# Patient Record
Sex: FEMALE | Race: WHITE | Employment: UNEMPLOYED | URBAN - METROPOLITAN AREA
[De-identification: names, ages, dates, MRNs, and addresses within clinical notes are randomized per-mention and may not be internally consistent; named-entity substitution may affect disease eponyms.]

---
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
## 2022-01-04 ENCOUNTER — OFFICE VISIT (OUTPATIENT)
Dept: FAMILY MEDICINE CLINIC | Age: 22
End: 2022-01-04

## 2022-01-04 VITALS
WEIGHT: 105 LBS | SYSTOLIC BLOOD PRESSURE: 128 MMHG | TEMPERATURE: 98.1 F | OXYGEN SATURATION: 98 % | DIASTOLIC BLOOD PRESSURE: 80 MMHG | HEART RATE: 74 BPM | BODY MASS INDEX: 20.62 KG/M2 | HEIGHT: 60 IN | RESPIRATION RATE: 18 BRPM

## 2022-01-04 DIAGNOSIS — U07.1 COVID-19: ICD-10-CM

## 2022-01-04 DIAGNOSIS — R05.9 COUGH: Primary | ICD-10-CM

## 2022-01-04 LAB
Lab: ABNORMAL
PERFORMING INSTRUMENT: ABNORMAL
QC PASS/FAIL: ABNORMAL
SARS-COV-2, POC: DETECTED

## 2022-01-04 PROCEDURE — 99203 OFFICE O/P NEW LOW 30 MIN: CPT | Performed by: PHYSICIAN ASSISTANT

## 2022-01-04 PROCEDURE — 87426 SARSCOV CORONAVIRUS AG IA: CPT | Performed by: PHYSICIAN ASSISTANT

## 2022-01-04 ASSESSMENT — ENCOUNTER SYMPTOMS
DIARRHEA: 0
NAUSEA: 0
BACK PAIN: 0
ABDOMINAL PAIN: 0
PHOTOPHOBIA: 0
SHORTNESS OF BREATH: 0
SORE THROAT: 0
VOMITING: 0
COUGH: 1

## 2022-01-04 NOTE — PROGRESS NOTES
22  Nela Parker : 2000 Sex: female  Age 25 y.o. Subjective:  Chief Complaint   Patient presents with    Cough         77-year-old female presents to the walk-in clinic with her boyfriend who is her . She started Saturday night with cough, congestion and feeling feverish. She has not taking any over-the-counter medications. She is not vaccinated. She denies any chance of pregnancy. No shortness of breath or chest pain. No hemoptysis. No nausea or vomiting. Review of Systems   Constitutional: Positive for fever. Negative for chills. HENT: Positive for congestion. Negative for ear pain and sore throat. Eyes: Negative for photophobia and visual disturbance. Respiratory: Positive for cough. Negative for shortness of breath. Cardiovascular: Negative for chest pain. Gastrointestinal: Negative for abdominal pain, diarrhea, nausea and vomiting. Genitourinary: Negative for difficulty urinating, dysuria, frequency and urgency. Musculoskeletal: Negative for back pain, neck pain and neck stiffness. Skin: Negative for rash. Neurological: Negative for dizziness, syncope, weakness, light-headedness and headaches. Hematological: Negative for adenopathy. Does not bruise/bleed easily. Psychiatric/Behavioral: Negative for agitation and confusion. All other systems reviewed and are negative. PMH:   History reviewed. No pertinent past medical history. History reviewed. No pertinent surgical history. History reviewed. No pertinent family history. Medications:   No current outpatient medications on file. Allergies:   No Known Allergies    Social History:     Social History     Tobacco Use    Smoking status: Never Smoker    Smokeless tobacco: Never Used   Substance Use Topics    Alcohol use: Never    Drug use: Never       Patient lives at home.     Physical Exam:     Vitals:    22 0859   BP: 128/80   Pulse: 74   Resp: 18   Temp: 98.1 °F (36.7 °C)   TempSrc: Temporal   SpO2: 98%   Weight: 105 lb (47.6 kg)   Height: 5' (1.524 m)       Exam:  Physical Exam  Vitals and nursing note reviewed. Constitutional:       General: She is not in acute distress. Appearance: She is well-developed. HENT:      Head: Normocephalic and atraumatic. Right Ear: Tympanic membrane normal.      Left Ear: Tympanic membrane normal.      Nose: Nose normal.      Mouth/Throat:      Mouth: Mucous membranes are moist.      Pharynx: Oropharynx is clear. Eyes:      Conjunctiva/sclera: Conjunctivae normal.      Pupils: Pupils are equal, round, and reactive to light. Cardiovascular:      Rate and Rhythm: Normal rate and regular rhythm. Pulmonary:      Effort: Pulmonary effort is normal. No respiratory distress. Breath sounds: Normal breath sounds. Abdominal:      General: Bowel sounds are normal.      Palpations: Abdomen is soft. Tenderness: There is no abdominal tenderness. Musculoskeletal:         General: Normal range of motion. Cervical back: Normal range of motion. No rigidity. Lymphadenopathy:      Cervical: No cervical adenopathy. Skin:     General: Skin is warm and dry. Neurological:      General: No focal deficit present. Mental Status: She is alert and oriented to person, place, and time. Psychiatric:         Mood and Affect: Mood normal.         Behavior: Behavior normal.         Thought Content: Thought content normal.         Judgment: Judgment normal.           Testing:     .  Results for orders placed or performed in visit on 01/04/22   POCT COVID-19, Antigen   Result Value Ref Range    SARS-COV-2, POC Detected (A) Not Detected    Lot Number [id number]     QC Pass/Fail pass     Performing Instrument qLearning              Medical Decision Making:       Patient upon arrival did not appear toxic or lethargic. Vital signs were reviewed. Past medical history reviewed. Allergies reviewed.      Medications reviewed. Patient is presenting with the above complaint of cough and congestion. Rapid antigen is positive for COVID-19. Patient was educated on this diagnosis. Patient is to use over-the-counter analgesics and decongestants as well as vitamins. Patient is to get planty of rest. Patient is to maintain good oral intake. Patient is to isolate for 5 days from the date of the positive test.  If asymptomatic, the patient will be required to continue to wear a mask for an additional 5 days after that. Patient is to monitor symptoms. Patient was educated on signs and symptoms that would warrant emergent evaluation in the emergency department. Patient will follow up closely with PCP. Patient understands the plan is agreeable. They will return or go to the emergency department for worsening symptoms. Clinical Impression:   Cristiana Olvera was seen today for cough. Diagnoses and all orders for this visit:    Cough  -     POCT COVID-19, Antigen    COVID-19        The patient is to call for any concerns or return if any of the signs or symptoms worsen. The patient is to follow-up with PCP in the next 2-3 days for repeat evaluation repeat assessment or go directly to the emergency department. SIGNATURE: Gisell Stokes PA-C

## 2022-01-04 NOTE — LETTER
[facility]  [address] [place] [address]  Phone: [phone]  Fax: [address] [place], [place]        January 4, 2022     Patient: Nba Anthony   YOB: 2000   Date of Visit: 1/4/2022       To Whom it May Concern:    Nba Anthony was seen in my clinic on 1/4/2022. She has tested positive for COVID-19. She may return to work on 1/10/2022 but is required to wear a mask for an additional 5 days. If you have any questions or concerns, please don't hesitate to call.     Sincerely,         ARLENE YU

## 2022-06-27 ENCOUNTER — INITIAL PRENATAL (OUTPATIENT)
Dept: OBGYN | Age: 22
End: 2022-06-27

## 2022-06-27 ENCOUNTER — CLINICAL DOCUMENTATION (OUTPATIENT)
Dept: OBGYN | Age: 22
End: 2022-06-27

## 2022-06-27 VITALS
WEIGHT: 97 LBS | DIASTOLIC BLOOD PRESSURE: 52 MMHG | BODY MASS INDEX: 18.94 KG/M2 | SYSTOLIC BLOOD PRESSURE: 96 MMHG | HEART RATE: 76 BPM

## 2022-06-27 DIAGNOSIS — Z32.01 POSITIVE PREGNANCY TEST: ICD-10-CM

## 2022-06-27 DIAGNOSIS — Z3A.11 11 WEEKS GESTATION OF PREGNANCY: ICD-10-CM

## 2022-06-27 LAB
CONTROL: NORMAL
GLUCOSE URINE, POC: NEGATIVE
PREGNANCY TEST URINE, POC: POSITIVE
PROTEIN UA: POSITIVE

## 2022-06-27 PROCEDURE — 81025 URINE PREGNANCY TEST: CPT | Performed by: STUDENT IN AN ORGANIZED HEALTH CARE EDUCATION/TRAINING PROGRAM

## 2022-06-27 PROCEDURE — 99203 OFFICE O/P NEW LOW 30 MIN: CPT | Performed by: STUDENT IN AN ORGANIZED HEALTH CARE EDUCATION/TRAINING PROGRAM

## 2022-06-27 PROCEDURE — 81002 URINALYSIS NONAUTO W/O SCOPE: CPT | Performed by: STUDENT IN AN ORGANIZED HEALTH CARE EDUCATION/TRAINING PROGRAM

## 2022-06-27 NOTE — PROGRESS NOTES
CHW introduce Centering to patient. Patient will participate in GRP 24 )n 8/18/2022 @ 1:00 pm . Transportation will be provided for all appointments.

## 2022-06-27 NOTE — PROGRESS NOTES
Patient alert and pleasant with no complaints. Here today for to establish care and for initial prenatal visit. Pregnancy test positive. Urine for glucose negative and protein obtained with trace results. Discharge instructions have been discussed with the patient. Patient advised to call our office with any questions or concerns. Voiced understanding.

## 2022-06-27 NOTE — PROGRESS NOTES
Denise Caraballo is sneezing, coughing and has a runny nose  She has not testing herself for COVID  She says that she has the flu  Asked patient to return once feeling well  Ordered US and prenatal labs

## 2022-06-29 LAB — URINE CULTURE, ROUTINE: NORMAL

## 2022-06-30 LAB
C. TRACHOMATIS DNA ,URINE: NEGATIVE
N. GONORRHOEAE DNA, URINE: NEGATIVE
SOURCE: NORMAL

## 2022-07-05 ENCOUNTER — TELEPHONE (OUTPATIENT)
Dept: OBGYN | Age: 22
End: 2022-07-05

## 2022-07-05 NOTE — TELEPHONE ENCOUNTER
CHW called patient to confirm her prenatal appointment for 7/7/2022 @ 9:45 am. Transportation for patient will be provided.

## 2022-07-07 ENCOUNTER — ROUTINE PRENATAL (OUTPATIENT)
Dept: OBGYN | Age: 22
End: 2022-07-07

## 2022-07-07 VITALS
HEART RATE: 69 BPM | WEIGHT: 96 LBS | SYSTOLIC BLOOD PRESSURE: 112 MMHG | BODY MASS INDEX: 18.75 KG/M2 | DIASTOLIC BLOOD PRESSURE: 62 MMHG

## 2022-07-07 DIAGNOSIS — Z34.91 PRENATAL CARE IN FIRST TRIMESTER: Primary | ICD-10-CM

## 2022-07-07 DIAGNOSIS — Z32.01 POSITIVE PREGNANCY TEST: ICD-10-CM

## 2022-07-07 DIAGNOSIS — Z34.91 PRENATAL CARE IN FIRST TRIMESTER: ICD-10-CM

## 2022-07-07 LAB
ALBUMIN SERPL-MCNC: 4.2 G/DL (ref 3.5–5.2)
ALP BLD-CCNC: 54 U/L (ref 35–104)
ALT SERPL-CCNC: 11 U/L (ref 0–32)
ANION GAP SERPL CALCULATED.3IONS-SCNC: 14 MMOL/L (ref 7–16)
AST SERPL-CCNC: 21 U/L (ref 0–31)
BASOPHILS ABSOLUTE: 0.02 E9/L (ref 0–0.2)
BASOPHILS RELATIVE PERCENT: 0.3 % (ref 0–2)
BILIRUB SERPL-MCNC: 0.3 MG/DL (ref 0–1.2)
BUN BLDV-MCNC: 5 MG/DL (ref 6–20)
CALCIUM SERPL-MCNC: 9.1 MG/DL (ref 8.6–10.2)
CHLORIDE BLD-SCNC: 105 MMOL/L (ref 98–107)
CO2: 19 MMOL/L (ref 22–29)
CREAT SERPL-MCNC: 0.4 MG/DL (ref 0.5–1)
EOSINOPHILS ABSOLUTE: 0.11 E9/L (ref 0.05–0.5)
EOSINOPHILS RELATIVE PERCENT: 1.7 % (ref 0–6)
GFR AFRICAN AMERICAN: >60
GFR NON-AFRICAN AMERICAN: >60 ML/MIN/1.73
GLUCOSE BLD-MCNC: 75 MG/DL (ref 74–99)
GLUCOSE URINE, POC: NEGATIVE
HCT VFR BLD CALC: 36.5 % (ref 34–48)
HEMOGLOBIN: 11.4 G/DL (ref 11.5–15.5)
IMMATURE GRANULOCYTES #: 0.02 E9/L
IMMATURE GRANULOCYTES %: 0.3 % (ref 0–5)
LYMPHOCYTES ABSOLUTE: 1.97 E9/L (ref 1.5–4)
LYMPHOCYTES RELATIVE PERCENT: 29.7 % (ref 20–42)
MCH RBC QN AUTO: 23.7 PG (ref 26–35)
MCHC RBC AUTO-ENTMCNC: 31.2 % (ref 32–34.5)
MCV RBC AUTO: 75.9 FL (ref 80–99.9)
MONOCYTES ABSOLUTE: 0.62 E9/L (ref 0.1–0.95)
MONOCYTES RELATIVE PERCENT: 9.4 % (ref 2–12)
NEUTROPHILS ABSOLUTE: 3.89 E9/L (ref 1.8–7.3)
NEUTROPHILS RELATIVE PERCENT: 58.6 % (ref 43–80)
PDW BLD-RTO: 14 FL (ref 11.5–15)
PLATELET # BLD: 353 E9/L (ref 130–450)
PMV BLD AUTO: 9.6 FL (ref 7–12)
POTASSIUM SERPL-SCNC: 4.3 MMOL/L (ref 3.5–5)
PROTEIN UA: NEGATIVE
RBC # BLD: 4.81 E12/L (ref 3.5–5.5)
SODIUM BLD-SCNC: 138 MMOL/L (ref 132–146)
TOTAL PROTEIN: 7.5 G/DL (ref 6.4–8.3)
VITAMIN D 25-HYDROXY: 16 NG/ML (ref 30–100)
WBC # BLD: 6.6 E9/L (ref 4.5–11.5)

## 2022-07-07 PROCEDURE — 81002 URINALYSIS NONAUTO W/O SCOPE: CPT | Performed by: MIDWIFE

## 2022-07-07 PROCEDURE — 99213 OFFICE O/P EST LOW 20 MIN: CPT | Performed by: MIDWIFE

## 2022-07-07 PROCEDURE — 99212 OFFICE O/P EST SF 10 MIN: CPT | Performed by: MIDWIFE

## 2022-07-07 PROCEDURE — S0197 PRENATAL VITAMINS 30 DAY: HCPCS | Performed by: MIDWIFE

## 2022-07-07 NOTE — PROGRESS NOTES
Marry Irvin is a 25 y.o. female 12w6d  Prenatal Visit        OB History    Para Term  AB Living   1             SAB IAB Ectopic Molar Multiple Live Births                    # Outcome Date GA Lbr Star/2nd Weight Sex Delivery Anes PTL Lv   1 Current                  Mother's Prenatal Vitals  BP: 112/62  Weight: (!) 96 lb (43.5 kg)  Heart Rate: 69  Patient Position: Sitting  Alb/Glu  Albumin: Negative  Glucose: Negative  Prenatal Fetal Information  Fetal Heart Rate: 155  Movement: Absent    The patient was seen and evaluated. No cramping or leakage of fluid. Warning signs were reviewed. The S/S of Pre-Eclampsia were reviewed with the patient in detail. She is to report any of these if they occur. She currently denies any of these. The patient had prenatal labs ordered and will complete today     will assist with scheduling US     Assessment:  1. Marry Irvin is a 25 y.o. female  2.   3. 12w6d    There are no problems to display for this patient. Plan:  The patient will return to the office for her next visit in 4 weeks. Pt would like genetic testing done. Explained the time frame blood work needs collected. Pt states she might be moving an hour away, and will need her records transferred.   She will call if she moves

## 2022-07-07 NOTE — PROGRESS NOTES
Patient here today for routine prenatal visit at [address] Patient denies feeling any cramping or vaginal bleeding. Prenatal blood work drawn, labeled and sent to lab. Suri Tavares translated for visit and will assist patient in getting ob ultrasound scheduled. Discharge instructions have been discussed with the patient by JM Noel and she voiced understanding of plan of care. Patient advised to call our office with any questions or concerns.

## 2022-07-08 LAB
ABO/RH: NORMAL
ANTIBODY SCREEN: NORMAL
HEPATITIS B SURFACE ANTIGEN INTERPRETATION: NORMAL
HEPATITIS C ANTIBODY INTERPRETATION: NORMAL
HIV-1 AND HIV-2 ANTIBODIES: NORMAL
RPR: NORMAL

## 2022-07-11 LAB — RUBELLA ANTIBODY IGG: NORMAL

## 2022-07-12 ENCOUNTER — TELEPHONE (OUTPATIENT)
Dept: OBGYN | Age: 22
End: 2022-07-12

## 2022-07-12 RX ORDER — ERGOCALCIFEROL 1.25 MG/1
50000 CAPSULE ORAL WEEKLY
Qty: 12 CAPSULE | Refills: 1 | Status: SHIPPED | OUTPATIENT
Start: 2022-07-12 | End: 2022-08-23

## 2022-07-13 ENCOUNTER — HOSPITAL ENCOUNTER (OUTPATIENT)
Dept: ULTRASOUND IMAGING | Age: 22
Discharge: HOME | End: 2022-07-15

## 2022-07-13 DIAGNOSIS — Z3A.11 11 WEEKS GESTATION OF PREGNANCY: ICD-10-CM

## 2022-07-13 PROCEDURE — 76801 OB US < 14 WKS SINGLE FETUS: CPT

## 2022-08-04 ENCOUNTER — ROUTINE PRENATAL (OUTPATIENT)
Dept: OBGYN | Age: 22
End: 2022-08-04

## 2022-08-04 VITALS
DIASTOLIC BLOOD PRESSURE: 55 MMHG | HEART RATE: 73 BPM | WEIGHT: 98 LBS | BODY MASS INDEX: 19.14 KG/M2 | SYSTOLIC BLOOD PRESSURE: 100 MMHG

## 2022-08-04 DIAGNOSIS — Z34.02 ENCOUNTER FOR SUPERVISION OF NORMAL FIRST PREGNANCY IN SECOND TRIMESTER: ICD-10-CM

## 2022-08-04 DIAGNOSIS — O09.892 HIGH RISK TEEN PREGNANCY IN SECOND TRIMESTER: ICD-10-CM

## 2022-08-04 DIAGNOSIS — Z34.92 PRENATAL CARE, SECOND TRIMESTER: Primary | ICD-10-CM

## 2022-08-04 PROBLEM — Z34.00 SUPERVISION OF NORMAL FIRST PREGNANCY: Status: ACTIVE | Noted: 2022-08-04

## 2022-08-04 LAB
GLUCOSE URINE, POC: NEGATIVE
PROTEIN UA: NEGATIVE

## 2022-08-04 PROCEDURE — 81002 URINALYSIS NONAUTO W/O SCOPE: CPT | Performed by: MIDWIFE

## 2022-08-04 PROCEDURE — 99213 OFFICE O/P EST LOW 20 MIN: CPT | Performed by: MIDWIFE

## 2022-08-04 PROCEDURE — 99212 OFFICE O/P EST SF 10 MIN: CPT | Performed by: MIDWIFE

## 2022-08-04 RX ORDER — PNV NO.95/FERROUS FUM/FOLIC AC 28MG-0.8MG
1 TABLET ORAL DAILY
Qty: 30 TABLET | Refills: 12 | Status: SHIPPED
Start: 2022-08-04 | End: 2022-09-01 | Stop reason: SDUPTHER

## 2022-08-04 NOTE — PROGRESS NOTES
Christa Villegas is a 25 y.o. female 16w7d        OB History    Para Term  AB Living   1             SAB IAB Ectopic Molar Multiple Live Births                    # Outcome Date GA Lbr Star/2nd Weight Sex Delivery Anes PTL Lv   1 Current                  Mother's Prenatal Vitals  BP: (!) 100/55  Weight: (!) 98 lb (44.5 kg)  Heart Rate: 73  Patient Position: Sitting  Alb/Glu  Albumin: Negative  Glucose: Negative  Prenatal Fetal Information  Fetal HR: 142  Movement: Absent    The patient was seen and evaluated. There was negative fetal movements. No cramping or leakage of fluid. The patients anatomy ultrasound has been scheduled for 22    The S/S of Pre-Eclampsia were reviewed with the patient in detail. She is to report any of these if they occur. She currently denies any of these. The patient is RH positive Rhogam Ordered no    Assessment:  1. Christa Villeags is a 25 y.o. female  2.   3. 16w6d    There are no problems to display for this patient. Plan:  The patient will return to the office for her next visit in 4 weeks. See antepartum flow sheet.     Pt to begin Centering Pregnancy 22  Prenatal vitamins sent to pharmacy  Answered all questions

## 2022-08-04 NOTE — PROGRESS NOTES
Patient here today for routine prenatal visit at [place] [address]. Patient denies feeling any contractions, leaking of fluid or vaginal bleeding. Andrew Posada translated for entire visit. Discharge instructions have been discussed with the patient by JM Mcmanus and patient voiced understanding of plan of care. Patient advised to call our office with any questions or concerns.

## 2022-08-17 ENCOUNTER — CLINICAL DOCUMENTATION (OUTPATIENT)
Dept: OBGYN | Age: 22
End: 2022-08-17

## 2022-08-17 NOTE — PROGRESS NOTES
CHW sent a detail text message reminding patient of her Centering appointment for tomorrow at 1 :00 Pm

## 2022-08-18 ENCOUNTER — ROUTINE PRENATAL (OUTPATIENT)
Dept: OBGYN | Age: 22
End: 2022-08-18

## 2022-08-18 VITALS
BODY MASS INDEX: 19.22 KG/M2 | SYSTOLIC BLOOD PRESSURE: 97 MMHG | WEIGHT: 98.4 LBS | DIASTOLIC BLOOD PRESSURE: 61 MMHG | HEART RATE: 76 BPM

## 2022-08-18 DIAGNOSIS — Z3A.18 PREGNANCY WITH 18 COMPLETED WEEKS GESTATION: Primary | ICD-10-CM

## 2022-08-18 DIAGNOSIS — O09.892 HIGH RISK TEEN PREGNANCY, SECOND TRIMESTER: ICD-10-CM

## 2022-08-18 LAB
GLUCOSE URINE, POC: NEGATIVE
PROTEIN UA: NEGATIVE

## 2022-08-18 PROCEDURE — 99078 GROUP HEALTH EDUCATION: CPT | Performed by: OBSTETRICS & GYNECOLOGY

## 2022-08-18 PROCEDURE — 99213 OFFICE O/P EST LOW 20 MIN: CPT | Performed by: OBSTETRICS & GYNECOLOGY

## 2022-08-18 PROCEDURE — 81002 URINALYSIS NONAUTO W/O SCOPE: CPT | Performed by: OBSTETRICS & GYNECOLOGY

## 2022-08-18 PROCEDURE — S0197 PRENATAL VITAMINS 30 DAY: HCPCS | Performed by: OBSTETRICS & GYNECOLOGY

## 2022-08-18 NOTE — PROGRESS NOTES
Patient participated in [facility] 24 S 1  Topics Covered: Common Discomforts and Making Healthy Lifestyles Choices. Enablers: Welcome Kit.
satisfaction. My final recommendations will be communicated back to the requesting physician by way of FAX and letter via [address] [facility] [facility] mail. Alfnoso Delgado provided translation for this visit.     Danii Gutiérrez MD

## 2022-08-23 ENCOUNTER — ANCILLARY PROCEDURE (OUTPATIENT)
Dept: OBGYN CLINIC | Age: 22
End: 2022-08-23

## 2022-08-23 ENCOUNTER — INITIAL PRENATAL (OUTPATIENT)
Dept: OBGYN CLINIC | Age: 22
End: 2022-08-23

## 2022-08-23 VITALS
DIASTOLIC BLOOD PRESSURE: 75 MMHG | SYSTOLIC BLOOD PRESSURE: 125 MMHG | HEART RATE: 81 BPM | BODY MASS INDEX: 19.55 KG/M2 | WEIGHT: 100.13 LBS

## 2022-08-23 DIAGNOSIS — O41.8X20 SUBCHORIONIC HEMORRHAGE OF PLACENTA IN SECOND TRIMESTER, SINGLE OR UNSPECIFIED FETUS: ICD-10-CM

## 2022-08-23 DIAGNOSIS — Z36.89 ENCOUNTER FOR FETAL ANATOMIC SURVEY: ICD-10-CM

## 2022-08-23 DIAGNOSIS — O46.8X2 SUBCHORIONIC HEMORRHAGE OF PLACENTA IN SECOND TRIMESTER, SINGLE OR UNSPECIFIED FETUS: ICD-10-CM

## 2022-08-23 DIAGNOSIS — Z3A.18 18 WEEKS GESTATION OF PREGNANCY: ICD-10-CM

## 2022-08-23 DIAGNOSIS — O43.112 CIRCUMVALLATE PLACENTA DURING PREGNANCY IN SECOND TRIMESTER, ANTEPARTUM: Primary | ICD-10-CM

## 2022-08-23 LAB
GLUCOSE URINE, POC: NEGATIVE
PROTEIN UA: NEGATIVE

## 2022-08-23 PROCEDURE — 81002 URINALYSIS NONAUTO W/O SCOPE: CPT | Performed by: OBSTETRICS & GYNECOLOGY

## 2022-08-23 PROCEDURE — 99203 OFFICE O/P NEW LOW 30 MIN: CPT | Performed by: OBSTETRICS & GYNECOLOGY

## 2022-08-23 PROCEDURE — 76811 OB US DETAILED SNGL FETUS: CPT | Performed by: OBSTETRICS & GYNECOLOGY

## 2022-08-23 PROCEDURE — 99242 OFF/OP CONSLTJ NEW/EST SF 20: CPT | Performed by: OBSTETRICS & GYNECOLOGY

## 2022-08-23 NOTE — PROGRESS NOTES
22    RE:  Daniel Wei   : 2000   AGE: 25 y.o. REFERRING PROVIDERS:                         DO Jayashree Cohen      Thank you for referring Daniel Wei a 25 y.o.   who is seen today in our office. She does not speak [place]. I communicated with her using [facility]  Ms Isi Yanesns:  Consult for advice and opinion on pregnant patient in the second trimester. Mrs Daniel Wei gave the following history when I saw her today:    OB History    Para Term  AB Living   1 0 0 0 0 0   SAB IAB Ectopic Molar Multiple Live Births   0 0 0 0 0 0      # Outcome Date GA Lbr Star/2nd Weight Sex Delivery Anes PTL Lv   1 Current              PAST GYNECOLOGICAL  HISTORY:  Negative for abnormal pap smears requiring surgical treatment. Negative for sexually transmitted diseases. PAST MEDICAL HISTORY:   Negative for Hypertension, Diabetes, Thyroid disease , Asthma or Heart disease. PAST SURGICAL HISTORY:  Negative for Appendectomy, Cholecystectomy or surgery on the cervix such as LEEP, [facility] or Cryotherapy. ALLERGIES:    No Known Allergies    MEDICATIONS:    Prenatal Vitamins    SOCIAL  HISTORY: Denies smoking, Alcohol and Drug abuse. REVIEW OF SYSTEMS:    CONSTITUTIONAL : No fever, no chills   HEENT :  No headache, no visual changes, no rhinorrhea, no sore throat   CARDIOVASCULAR :  No pain, no palpitations, no edema   RESPIRATORY :  No pain, no shortness of breath   GASTROINTESTINAL : No N/V, no D/C, no abdominal pain   GENITOURINARY :  No dysuria, hematuria and no incontinence   MUSCULOSKELETAL:  No myalgia, No back pain  NEUROLOGICAL :  No numbness, no tingling, no tremors. No history of seizures    FAMILY MEDICAL HISTORY:   Negative for birth defects, chromosomal anomalies and Mental retardation.     OB Genetic Screening    Patient's Age 35+ at Date of Delivery the ultrasound pictures stored in the hard drive of the ultrasound machine with the patient. Please refer to the enclosed copy of the ultrasound report for further information. IMPRESSION:  A 18w2d intrauterine pregnancy. Circumvallate placenta. Low Lying Placenta  Subchorionic Hematoma  Declined Transvaginal Ultrasound  Declined prenatal genetic testing for fetal chromosome abnormalities      RECOMMENDATIONS/PLAN:  I discussed with the patient the following points: The benefits and limitations of ultrasound in prenatal diagnosis. Some defects might not always be seen by ultrasound. Estimated incidence of these defects in the general population is 2- 4%. No structural  anomalies are noted. Only genetic amniocentesis can rule out fetal chromosome anomalies. Normal ultrasound does not. The size of her baby is appropriate for gestational age. Based on her age and the absence of chromosomal markers by ultrasound today, the risk of chromosomal anomalies is small and does not indicate a need for an amniocentesis, a procedure that might cause pregnancy loss ( the risk of loss is quoted to be between 1:200 to 1:500). An amniocentesis is indicated if fetal structural defects are seen or if the first Trimester,  second trimester hormonal screening test (quad screen) , or if the cell free DNA test NIPT: non-invasive prenatal test) showed an increase risk for Chromosomal anomalies. She declined prenatal genetic testing for fetal chromosome abnormalities. She declined the transvaginal ultrasound evaluation to check on cervical length and risk for  delivery. Circumvellate placenta is associated with an increased risk of developing intrauterine growth restriction, placental abruption and premature delivery. Subchorionic hematoma noted also on ultrasound today I explained to the patient and father of baby that in all probability it will resolve with advancing gestation.   Follow-up ultrasound evaluation will be necessary to check on its resolution. The Placenta appears to be low-lying on transabdominal ultrasound evaluation. In all probability it will move away from the lower uterine segment with advanced gestation. The likelihood of having placenta previa is low in a primigravida. She is to continue to follow with you in your office for ongoing obstetric care. I recommend repeat ultrasound evaluation in 4-6 weeks to check on fetal wellbeing and growth. .    Thank you again, doctor, for allowing us to be of service to your patient. If I can be of further assistance, please do not hesitate to call. Sincerely,        Keith Rebolledo M.D., [facility] Calixto      The total time in minutes spent reviewing medical records, reviewing imaging studies, performing ultrasonic imaging, reviewing laboratory testing, and documenting information was 35 minutes, of which, 50% of the time was spent in patient education, counseling, and coordinating care with the patient, her provider, and/or her family. I answered all of her questions to her satisfaction. Current encounter billing:  [place] OFFICE CONSULTATION NEW/ESTAB PATIENT 30 MIN [[id number]]  US OB Detail Fetal Anatomy Single or 1st [id number] Custom]    **This report has been created using voice recognition software.  It may contain minor errors     which are inherent in voice recognition technology**

## 2022-08-23 NOTE — PROGRESS NOTES
Pt here for prenatal u/s for anatomy  Pt denies any bleeding or cramping  Pt not feeling fetal movement  Tameka used for communication as  as patient speaks [place]

## 2022-08-23 NOTE — PATIENT INSTRUCTIONS

## 2022-08-23 NOTE — LETTER
22        RE:  Brenda Hodge   : 2000   AGE: 25 y.o. REFERRING PHYSICIANs:           Glo Vanessa M.D.               MD Ariel Duvall [facility]                    Dear Booker Your patient  Brenda Hodge was seen in our maternal-fetal medicine office today 2022. She was accompanied by father of her baby Mr. [facility]      Thank you again, doctor, for allowing us to be of service to your patient. If I can be of further assistance, please do not hesitate to call.       Sincerely,        Tomeka Wall M.D., [facility]

## 2022-08-23 NOTE — LETTER
22    RE:  Madeline Domingo   : 2000   AGE: 25 y.o. REFERRING PROVIDERS:                         DO Jayashree Kaye      Thank you for referring Madeline Domingo a 25 y.o.   who is seen today in our office. She does not speak [place]. I communicated with her using [facility]  Ms Ericka Camp:   Consult for advice and opinion on pregnant patient in the second trimester. Mrs Madeline Domingo gave the following history when I saw her today:    OB History    Para Term  AB Living   1 0 0 0 0 0   SAB IAB Ectopic Molar Multiple Live Births   0 0 0 0 0 0      # Outcome Date GA Lbr Star/2nd Weight Sex Delivery Anes PTL Lv   1 Current              PAST GYNECOLOGICAL  HISTORY:  Negative for abnormal pap smears requiring surgical treatment. Negative for sexually transmitted diseases. PAST MEDICAL HISTORY:   Negative for Hypertension, Diabetes, Thyroid disease , Asthma or Heart disease. PAST SURGICAL HISTORY:  Negative for Appendectomy, Cholecystectomy or surgery on the cervix such as LEEP, [facility] or Cryotherapy. ALLERGIES:    No Known Allergies    MEDICATIONS:    Prenatal Vitamins    SOCIAL  HISTORY: Denies smoking, Alcohol and Drug abuse. REVIEW OF SYSTEMS:    CONSTITUTIONAL : No fever, no chills   HEENT :  No headache, no visual changes, no rhinorrhea, no sore throat   CARDIOVASCULAR :  No pain, no palpitations, no edema   RESPIRATORY :  No pain, no shortness of breath   GASTROINTESTINAL : No N/V, no D/C, no abdominal pain   GENITOURINARY :  No dysuria, hematuria and no incontinence   MUSCULOSKELETAL:  No myalgia, No back pain  NEUROLOGICAL :  No numbness, no tingling, no tremors. No history of seizures    FAMILY MEDICAL HISTORY:   Negative for birth defects, chromosomal anomalies and Mental retardation.     OB Genetic Screening    Patient's Age 35+ at Date of done in our office today. I reviewed the ultrasound pictures stored in the hard drive of the ultrasound machine with the patient. Please refer to the enclosed copy of the ultrasound report for further information. IMPRESSION:  1. A 18w2d intrauterine pregnancy. 2. Circumvallate placenta. 3. Low Lying Placenta  4. Subchorionic Hematoma  5. Declined Transvaginal Ultrasound  6. Declined prenatal genetic testing for fetal chromosome abnormalities      RECOMMENDATIONS/PLAN:  I discussed with the patient the following points:    1. The benefits and limitations of ultrasound in prenatal diagnosis. Some defects might not always be seen by ultrasound. Estimated incidence of these defects in the general population is 2- 4%. 2. No structural  anomalies are noted. Only genetic amniocentesis can rule out fetal chromosome anomalies. Normal ultrasound does not. 3. The size of her baby is appropriate for gestational age. 4. Based on her age and the absence of chromosomal markers by ultrasound today, the risk of chromosomal anomalies is small and does not indicate a need for an amniocentesis, a procedure that might cause pregnancy loss ( the risk of loss is quoted to be between 1:200 to 1:500). 5. An amniocentesis is indicated if fetal structural defects are seen or if the first Trimester,  second trimester hormonal screening test (quad screen) , or if the cell free DNA test NIPT: non-invasive prenatal test) showed an increase risk for Chromosomal anomalies. 6. She declined prenatal genetic testing for fetal chromosome abnormalities. 7. She declined the transvaginal ultrasound evaluation to check on cervical length and risk for  delivery. 8. Circumvellate placenta is associated with an increased risk of developing intrauterine growth restriction, placental abruption and premature delivery.    9. Subchorionic hematoma noted also on ultrasound today I explained to the patient and father of baby that in all probability it will resolve with advancing gestation. Follow-up ultrasound evaluation will be necessary to check on its resolution. 10. The Placenta appears to be low-lying on transabdominal ultrasound evaluation. In all probability it will move away from the lower uterine segment with advanced gestation. The likelihood of having placenta previa is low in a primigravida. 11. She is to continue to follow with you in your office for ongoing obstetric care. 12. I recommend repeat ultrasound evaluation in 4-6 weeks to check on fetal wellbeing and growth. .    Thank you again, doctor, for allowing us to be of service to your patient. If I can be of further assistance, please do not hesitate to call. Sincerely,        Tasia Vail M.D., [facility]      The total time in minutes spent reviewing medical records, reviewing imaging studies, performing ultrasonic imaging, reviewing laboratory testing, and documenting information was 35 minutes, of which, 50% of the time was spent in patient education, counseling, and coordinating care with the patient, her provider, and/or her family. I answered all of her questions to her satisfaction. Current encounter billing:  [place] OFFICE CONSULTATION NEW/ESTAB PATIENT 30 MIN [[id number]]  US OB Detail Fetal Anatomy Single or 1st [id number] Custom]    **This report has been created using voice recognition software.  It may contain minor errors     which are inherent in voice recognition technology**

## 2022-09-01 ENCOUNTER — ROUTINE PRENATAL (OUTPATIENT)
Dept: OBGYN | Age: 22
End: 2022-09-01

## 2022-09-01 VITALS
WEIGHT: 101.4 LBS | BODY MASS INDEX: 19.8 KG/M2 | DIASTOLIC BLOOD PRESSURE: 62 MMHG | SYSTOLIC BLOOD PRESSURE: 93 MMHG | HEART RATE: 76 BPM

## 2022-09-01 DIAGNOSIS — Z34.92 PRENATAL CARE IN SECOND TRIMESTER: Primary | ICD-10-CM

## 2022-09-01 PROCEDURE — 99213 OFFICE O/P EST LOW 20 MIN: CPT | Performed by: MIDWIFE

## 2022-09-01 PROCEDURE — 81002 URINALYSIS NONAUTO W/O SCOPE: CPT | Performed by: MIDWIFE

## 2022-09-01 RX ORDER — PNV NO.95/FERROUS FUM/FOLIC AC 28MG-0.8MG
1 TABLET ORAL DAILY
Qty: 30 TABLET | Refills: 12 | Status: SHIPPED | OUTPATIENT
Start: 2022-09-01

## 2022-09-01 NOTE — PROGRESS NOTES
Centering in Pregnancy Session 2        Carmenza Cristobal is a  female 19w4d  PAST OB HISTORY  OB History          1    Para        Term                AB        Living             SAB        IAB        Ectopic        Molar        Multiple        Live Births                      There was feeling  fetal movements yet. No contractions or leakage of fluid. She currently denies any bleeding, cramping, or contractions. Pt states she is tired, has pain to foot and headaches at work. Encourage to increase fluids and take tylenol. Call if symptoms do not improve. Follow up with PCP for foot pain    The patient was seen and evaluated. Physical Exam:  Alert, cooperative, oriented to time and Place  Abdomen soft non tender  Skin warm and dry, no peripheral edema noted. See flow sheet  Mother's Prenatal Vitals  BP: 93/62  Weight: 101 lb 6.4 oz (46 kg)  Heart Rate: 76  Patient Position: Sitting  Alb/Glu  Albumin: Negative  Glucose: Negative      The patients fetal anatomy ultrasound has been completed and reviewed with patient. Assessment:  1. IUP 19w4d weeks  2. Size  <, >, = to dates  3. Patient Active Problem List    Diagnosis Date Noted    High risk teen pregnancy in second trimester 2022    Supervision of normal first pregnancy 2022     4. The patient is RH positive Rhogam Ordered no      Plan: 1. The patient will return to [facility] for her next visit in 2 weeks. 2. The S/S of Pre-Eclampsia were reviewed with the patient in detail. She is to report of these (Headache un relieved by rest, fluids, or tylenol, Increase in edema, epigastric pain, or visual disturbances) if they occur. 4. MFM notes and plan of care reviewed, follow up has been planned. 5. Total face to face time 15 minutes, greater than 50% spent on counseling the patient or coordinating her care, or discussing complications and problems related to her pregnancy.   I answered all of her questions to her satisfaction.     REHANA Sanchez - BERTHA

## 2022-09-14 ENCOUNTER — TELEPHONE (OUTPATIENT)
Dept: OBGYN | Age: 22
End: 2022-09-14

## 2022-09-15 ENCOUNTER — ROUTINE PRENATAL (OUTPATIENT)
Dept: OBGYN | Age: 22
End: 2022-09-15

## 2022-09-15 VITALS
HEART RATE: 88 BPM | WEIGHT: 103.2 LBS | DIASTOLIC BLOOD PRESSURE: 69 MMHG | SYSTOLIC BLOOD PRESSURE: 98 MMHG | BODY MASS INDEX: 20.15 KG/M2

## 2022-09-15 DIAGNOSIS — O43.112 CIRCUMVALLATE PLACENTA IN SECOND TRIMESTER: ICD-10-CM

## 2022-09-15 DIAGNOSIS — R30.0 DYSURIA: ICD-10-CM

## 2022-09-15 DIAGNOSIS — O46.8X2 SUBCHORIONIC HEMATOMA IN SECOND TRIMESTER, SINGLE OR UNSPECIFIED FETUS: ICD-10-CM

## 2022-09-15 DIAGNOSIS — O09.892 HIGH RISK TEEN PREGNANCY IN SECOND TRIMESTER: Primary | ICD-10-CM

## 2022-09-15 DIAGNOSIS — Z78.9 NON-ENGLISH SPEAKING PATIENT: ICD-10-CM

## 2022-09-15 DIAGNOSIS — O41.8X20 SUBCHORIONIC HEMATOMA IN SECOND TRIMESTER, SINGLE OR UNSPECIFIED FETUS: ICD-10-CM

## 2022-09-15 DIAGNOSIS — O44.40 LOW-LYING PLACENTA: ICD-10-CM

## 2022-09-15 PROBLEM — O43.119 CIRCUMVALLATE PLACENTA: Status: ACTIVE | Noted: 2022-09-15

## 2022-09-15 PROBLEM — O46.8X9 SUBCHORIONIC HEMATOMA: Status: ACTIVE | Noted: 2022-09-15

## 2022-09-15 PROBLEM — O41.8X90 SUBCHORIONIC HEMATOMA: Status: ACTIVE | Noted: 2022-09-15

## 2022-09-15 LAB
BILIRUBIN URINE: NEGATIVE
BLOOD, URINE: NEGATIVE
CLARITY: CLEAR
COLOR: YELLOW
GLUCOSE URINE, POC: NEGATIVE
GLUCOSE URINE: NEGATIVE MG/DL
KETONES, URINE: NEGATIVE MG/DL
LEUKOCYTE ESTERASE, URINE: NEGATIVE
NITRITE, URINE: NEGATIVE
PH UA: 6 (ref 5–9)
PROTEIN UA: NEGATIVE
PROTEIN UA: NEGATIVE MG/DL
SPECIFIC GRAVITY UA: 1.02 (ref 1–1.03)
UROBILINOGEN, URINE: 0.2 E.U./DL

## 2022-09-15 PROCEDURE — 99213 OFFICE O/P EST LOW 20 MIN: CPT | Performed by: MIDWIFE

## 2022-09-15 PROCEDURE — 81002 URINALYSIS NONAUTO W/O SCOPE: CPT | Performed by: MIDWIFE

## 2022-09-15 PROCEDURE — 81003 URINALYSIS AUTO W/O SCOPE: CPT | Performed by: MIDWIFE

## 2022-09-15 NOTE — PROGRESS NOTES
Patient participated in [facility] 24 S 3   Topics: Mental relaxation, Managing Stress, and Benefits of Breastfeeding   Enables: Session 3 kit.

## 2022-09-15 NOTE — PROGRESS NOTES
Centering in Pregnancy Session 3: Benefits of Breastfeeding        Sotero Roach is a  female 21w4d  PAST OB HISTORY  OB History          1    Para        Term                AB        Living             SAB        IAB        Ectopic        Molar        Multiple        Live Births                      There was positive fetal movements. No contractions or leakage of fluid. She currently denies any bleeding, cramping, or contractions. Patient complains of irregular abdominal pain and pain with urination x 2 days. The patient was seen and evaluated. Physical Exam:  Alert, cooperative, oriented to time and Place  Abdomen soft non tender  Skin warm and dry, no peripheral edema noted. See flow sheet  Mother's Prenatal Vitals  BP: 98/69  Weight: 103 lb 3.2 oz (46.8 kg)  Heart Rate: 88  Patient Position: Sitting  Alb/Glu  Albumin: Negative  Glucose: Negative  Prenatal Fetal Information  Fundal Height (cm): 22 cm  Fetal HR: 142  Movement: Present      The patients fetal anatomy ultrasound has been completed and reviewed with patient       Assessment:  1. IUP 21w4d weeks  2. Size  = to dates  3. Pain with urination  Patient Active Problem List    Diagnosis Date Noted    Circumvallate placenta 09/15/2022    Low-lying placenta 09/15/2022    Non-English speaking patient 09/15/2022    Subchorionic hematoma 09/15/2022    High risk teen pregnancy in second trimester 2022    Supervision of normal first pregnancy 2022     4. The patient is RH positive Rhogam Ordered no      Plan: 1. The patient will return to [facility] her next visit in 2 weeks. 2. Urinalysis today   3. The S/S of Pre-Eclampsia were reviewed with the patient in detail. She is to report of these (Headache un relieved by rest, fluids, or tylenol, Increase in edema, epigastric pain, or visual disturbances) if they occur. 4. [facility] notes and plan of care reviewed, follow up has been planned.    5. Total face to

## 2022-09-28 ENCOUNTER — TELEPHONE (OUTPATIENT)
Dept: OBGYN | Age: 22
End: 2022-09-28

## 2022-09-29 ENCOUNTER — ROUTINE PRENATAL (OUTPATIENT)
Dept: OBGYN | Age: 22
End: 2022-09-29

## 2022-09-29 VITALS
SYSTOLIC BLOOD PRESSURE: 84 MMHG | HEART RATE: 107 BPM | DIASTOLIC BLOOD PRESSURE: 55 MMHG | WEIGHT: 106 LBS | BODY MASS INDEX: 20.7 KG/M2

## 2022-09-29 DIAGNOSIS — Z3A.23 23 WEEKS GESTATION OF PREGNANCY: Primary | ICD-10-CM

## 2022-09-29 LAB
GLUCOSE URINE, POC: NEGATIVE
PROTEIN UA: NEGATIVE

## 2022-09-29 PROCEDURE — 81002 URINALYSIS NONAUTO W/O SCOPE: CPT | Performed by: ADVANCED PRACTICE MIDWIFE

## 2022-09-29 PROCEDURE — 99213 OFFICE O/P EST LOW 20 MIN: CPT | Performed by: ADVANCED PRACTICE MIDWIFE
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
## 2022-10-04 ENCOUNTER — ROUTINE PRENATAL (OUTPATIENT)
Dept: OBGYN CLINIC | Age: 22
End: 2022-10-04

## 2022-10-04 ENCOUNTER — ANCILLARY PROCEDURE (OUTPATIENT)
Dept: OBGYN CLINIC | Age: 22
End: 2022-10-04

## 2022-10-04 VITALS
HEART RATE: 73 BPM | DIASTOLIC BLOOD PRESSURE: 75 MMHG | WEIGHT: 107.25 LBS | BODY MASS INDEX: 20.95 KG/M2 | SYSTOLIC BLOOD PRESSURE: 117 MMHG

## 2022-10-04 DIAGNOSIS — O43.112 CIRCUMVALLATE PLACENTA DURING PREGNANCY IN SECOND TRIMESTER, ANTEPARTUM: ICD-10-CM

## 2022-10-04 DIAGNOSIS — O44.42 LOW LYING PLACENTA NOS OR WITHOUT HEMORRHAGE, SECOND TRIMESTER: ICD-10-CM

## 2022-10-04 DIAGNOSIS — Z36.89 ENCOUNTER FOR FETAL ANATOMIC SURVEY: ICD-10-CM

## 2022-10-04 DIAGNOSIS — O41.8X20 SUBCHORIONIC HEMATOMA IN SECOND TRIMESTER, SINGLE OR UNSPECIFIED FETUS: Primary | ICD-10-CM

## 2022-10-04 DIAGNOSIS — Z3A.24 24 WEEKS GESTATION OF PREGNANCY: ICD-10-CM

## 2022-10-04 DIAGNOSIS — Z03.75 SUSPECTED SHORTENING OF CERVIX NOT FOUND: ICD-10-CM

## 2022-10-04 DIAGNOSIS — O46.8X2 SUBCHORIONIC HEMATOMA IN SECOND TRIMESTER, SINGLE OR UNSPECIFIED FETUS: Primary | ICD-10-CM

## 2022-10-04 LAB
GLUCOSE URINE, POC: NEGATIVE
PROTEIN UA: NEGATIVE

## 2022-10-04 PROCEDURE — 99213 OFFICE O/P EST LOW 20 MIN: CPT | Performed by: OBSTETRICS & GYNECOLOGY

## 2022-10-04 PROCEDURE — 76819 FETAL BIOPHYS PROFIL W/O NST: CPT | Performed by: OBSTETRICS & GYNECOLOGY

## 2022-10-04 PROCEDURE — 76817 TRANSVAGINAL US OBSTETRIC: CPT | Performed by: OBSTETRICS & GYNECOLOGY

## 2022-10-04 PROCEDURE — 76805 OB US >/= 14 WKS SNGL FETUS: CPT | Performed by: OBSTETRICS & GYNECOLOGY

## 2022-10-04 PROCEDURE — 76820 UMBILICAL ARTERY ECHO: CPT | Performed by: OBSTETRICS & GYNECOLOGY

## 2022-10-04 PROCEDURE — 81002 URINALYSIS NONAUTO W/O SCOPE: CPT | Performed by: OBSTETRICS & GYNECOLOGY

## 2022-10-04 NOTE — LETTER
10/4/22     RE:  Phyliss Dancer   : 2000   AGE: 25 y.o. REFERRING PROVIDERS:                         Brett Taylor      Mrs. Phyliss Dancer a 25 y.o.    is seen today on follow up in our office. She does not speak [place]. I communicated with [facility] using [facility]  Ms Sterling Guest:   Follow-up on a pregnant patient with ultrasound finding of circumvallate low-lying placenta with subchorionic hematoma who declined transvaginal ultrasound evaluation and prenatal genetic testing for fetal chromosome abnormalities, on previous visit to our office. MEDICATIONS:    Prenatal Vitamins    INTERVAL HISTORY:  Mrs Phyliss Dancer had an uneventful course of pregnancy since her last visit to our office. When seen today in our office she had no complaints. She reported good fetal movements. Denied any vaginal bleeding or spotting. PHYSICAL EXAMINATION:  General Appearance:  Healthy looking, alert, no acute distress. Eyes:     No pallor, no icterus, no photophobia. Ears:     No ear drainage. Nose:     No nasal drainage, no paranasal sinus tenderness. Throat:   Mucosa moist, no oral thrush, no exudate. Neck:     No nuchal rigidity. Back:     No CVA tenderness. Abdomen:    Soft nontender. Extremities:    No pretibial pitting edema, no calf muscle tenderness. Skin:     No rashes, no lesions. BP: 117/75 Weight: 107 lb 4 oz (48.6 kg)   Heart Rate: 73     Body mass index is 20.95 kg/m². Urine dipstick:  Glucose : Negative   Albumin:  Negative       An ultrasound evaluation was done in our office today. I reviewed the ultrasound pictures stored in the hard drive of the ultrasound machine with the patient. Please refer to the enclosed copy of the ultrasound report for further information. IMPRESSION:  1. A  24w2d  intrauterine gestation.    2. Previous Circumvallate Placenta, resovled  3. Previous Low Lying Placenta, resovled  4. Previous Subchorionic Hematoma,resolved. 5. Declined prenatal genetic testing for fetal chromosome abnormalities    RECOMMENDATIONS AND PLAN:  I discussed with the patient the following points:    1. The benefits and limitations of ultrasound in prenatal diagnosis. Some defects might not always be seen by ultrasound. Estimated incidence of these defects in the general population is 2- 4%. 2. No structural  anomalies are noted. Only genetic amniocentesis can rule out fetal chromosome anomalies. Normal ultrasound does not. 3. The size of her baby is appropriate for gestational age. 4. The previously described circumvallate placenta and low-lying placenta resolved. 5. The previously described subchorionic hematoma also resolved. 6. Fetal well-being was confirmed today. The amount of fluid around baby is normal.  The Biophysical profile score of 8/8 is reassuring, and the umbilical artery Doppler studies are normal.  7. She should monitor fetal well-being at home by counting movements after dinner. Her baby should  move 10 times in 2 hours; otherwise, she should call your office immediately. She is also to call, if she develops any headaches, blurred vision, abdominal pain, bleeding, or spotting, which are signs of preeclampsia. 8. Cervical length is reassuring against risk of  delivery. 9. She is to continue to follow with you in your office for ongoing obstetric care. 10. If there is a need for further ultrasound evaluations during this pregnancy, please do not hesitate to call our office and schedule an appointment. Thank you again, , for allowing us to be of service to your patient. If I can be of further assistance, please do not hesitate to call.       Sincerely,        Austin Zapata M.D    The total time in minutes spent reviewing medical records, reviewing imaging studies, performing ultrasonic imaging, reviewing laboratory testing, and documenting information was 25  minutes, of which, 50% of the time was spent in patient education, counseling, and coordinating care with the patient, her provider, and/or her family. I answered all of her questions to her satisfaction. Current encounter billing:  ID OFFICE/OUTPATIENT ESTABLISHED LOW MDM 20-29 MIN [[id number]]  US OB 14 Plus Weeks Single or First Gestation [76755 Custom]  US Fetal Biophysical Profile WO Non Stress Testing [58921 Custom]  US Doppler Fetal Umbilical Artery [HBP4691 Custom]  US OB Transvaginal [92986 Custom]    **This report has been created using voice recognition software.  It may contain minor errors     which are inherent in voice recognition technology**  '

## 2022-10-04 NOTE — PROGRESS NOTES
10/4/22     RE:  Schuyler Self   : 2000   AGE: 25 y.o. REFERRING PROVIDERS:                         Kenny Hickey      Mrs. Schuyler Self a 25 y.o.    is seen today on follow up in our office. She does not speak [place]. I communicated with her using [facility]  Ms Amy Bill:  Follow-up on a pregnant patient with ultrasound finding of circumvallate low-lying placenta with subchorionic hematoma who declined transvaginal ultrasound evaluation and prenatal genetic testing for fetal chromosome abnormalities, on previous visit to our office. MEDICATIONS:    Prenatal Vitamins    INTERVAL HISTORY:  Mrs Schuyler Self had an uneventful course of pregnancy since her last visit to our office. When seen today in our office she had no complaints. She reported good fetal movements. Denied any vaginal bleeding or spotting. PHYSICAL EXAMINATION:  General Appearance:  Healthy looking, alert, no acute distress. Eyes:     No pallor, no icterus, no photophobia. Ears:     No ear drainage. Nose:     No nasal drainage, no paranasal sinus tenderness. Throat:   Mucosa moist, no oral thrush, no exudate. Neck:     No nuchal rigidity. Back:     No CVA tenderness. Abdomen:    Soft nontender. Extremities:    No pretibial pitting edema, no calf muscle tenderness. Skin:     No rashes, no lesions. BP: 117/75 Weight: 107 lb 4 oz (48.6 kg)   Heart Rate: 73     Body mass index is 20.95 kg/m². Urine dipstick:  Glucose : Negative   Albumin:  Negative       An ultrasound evaluation was done in our office today. I reviewed the ultrasound pictures stored in the hard drive of the ultrasound machine with the patient. Please refer to the enclosed copy of the ultrasound report for further information. IMPRESSION:  A  24w2d  intrauterine gestation.    Previous Circumvallate Placenta, resovled  Previous Low Lying Placenta, resovled  Previous Subchorionic Hematoma,resolved. Declined prenatal genetic testing for fetal chromosome abnormalities    RECOMMENDATIONS AND PLAN:  I discussed with the patient the following points: The benefits and limitations of ultrasound in prenatal diagnosis. Some defects might not always be seen by ultrasound. Estimated incidence of these defects in the general population is 2- 4%. No structural  anomalies are noted. Only genetic amniocentesis can rule out fetal chromosome anomalies. Normal ultrasound does not. The size of her baby is appropriate for gestational age. The previously described circumvallate placenta and low-lying placenta resolved. The previously described subchorionic hematoma also resolved. Fetal well-being was confirmed today. The amount of fluid around baby is normal.  The Biophysical profile score of 8/8 is reassuring, and the umbilical artery Doppler studies are normal.  She should monitor fetal well-being at home by counting movements after dinner. Her baby should  move 10 times in 2 hours; otherwise, she should call your office immediately. She is also to call, if she develops any headaches, blurred vision, abdominal pain, bleeding, or spotting, which are signs of preeclampsia. Cervical length is reassuring against risk of  delivery. She is to continue to follow with you in your office for ongoing obstetric care. If there is a need for further ultrasound evaluations during this pregnancy, please do not hesitate to call our office and schedule an appointment. Thank you again, , for allowing us to be of service to your patient. If I can be of further assistance, please do not hesitate to call.       Sincerely,        Luis Marquez M.D    The total time in minutes spent reviewing medical records, reviewing imaging studies, performing ultrasonic imaging, reviewing laboratory testing, and documenting information was 25  minutes, of which, 50% of the time was spent in patient education, counseling, and coordinating care with the patient, her provider, and/or her family. I answered all of her questions to her satisfaction. Current encounter billing:  [place] OFFICE/OUTPATIENT ESTABLISHED LOW MDM 20-29 MIN [[id number]]  US OB 14 Plus Weeks Single or First Gestation [60659 Custom]  US Fetal Biophysical Profile WO Non Stress Testing [65712 Custom]  US Doppler Fetal Umbilical Artery [PDY0057 Custom]  US OB Transvaginal [33546 Custom]    **This report has been created using voice recognition software.  It may contain minor errors     which are inherent in voice recognition technology**  '

## 2022-10-10 ENCOUNTER — HOSPITAL ENCOUNTER (OUTPATIENT)
Age: 22
Discharge: HOME | End: 2022-10-10
Attending: OBSTETRICS & GYNECOLOGY | Admitting: OBSTETRICS & GYNECOLOGY

## 2022-10-10 VITALS
HEART RATE: 81 BPM | RESPIRATION RATE: 16 BRPM | DIASTOLIC BLOOD PRESSURE: 71 MMHG | TEMPERATURE: 98 F | SYSTOLIC BLOOD PRESSURE: 123 MMHG

## 2022-10-10 PROCEDURE — 99214 OFFICE O/P EST MOD 30 MIN: CPT | Performed by: STUDENT IN AN ORGANIZED HEALTH CARE EDUCATION/TRAINING PROGRAM

## 2022-10-10 PROCEDURE — 99211 OFF/OP EST MAY X REQ PHY/QHP: CPT

## 2022-10-10 NOTE — H&P
Obstetric History and Physical       CHIEF COMPLAINT:  decreased fetal movement    HISTORY OF PRESENT ILLNESS:      Danielle Sanchez is a 25 y.o., , female who presents at 25w1d with an [address] of Estimated Date of Delivery: 23. OB History          1    Para        Term                AB        Living             SAB        IAB        Ectopic        Molar        Multiple        Live Births                Patient presents with a chief complaint as above and is being evaluated for decreased fetal movement  She last felt the baby move once this morning and then only last night. This is less than usual. She denies contractions, LOF, vaginal bleeding. She denies HA, SOB, fever, chills, recent illness  S/p recent MFM consult - Circumvallate Placenta, low lying placenta and subchorionic hematoma all resolved       PRENATAL CARE:    Complicated by: Circumvallate Placenta, low lying placenta and subchorionic hematoma    PAST OB HISTORY  OB History          1    Para        Term                AB        Living             SAB        IAB        Ectopic        Molar        Multiple        Live Births                    Past Medical History:    No past medical history on file. Past Surgical History:    No past surgical history on file. Allergies:  Patient has no known allergies.   Social History:    Social History     Socioeconomic History    Marital status: Single     Spouse name: Not on file    Number of children: Not on file    Years of education: Not on file    Highest education level: Not on file   Occupational History    Not on file   Tobacco Use    Smoking status: Never    Smokeless tobacco: Never   Vaping Use    Vaping Use: Never used   Substance and Sexual Activity    Alcohol use: Never    Drug use: Never    Sexual activity: Yes     Partners: Male   Other Topics Concern    Not on file   Social History Narrative    Not on file     Social Determinants of Health     Financial Resource Strain: Not on file   Food Insecurity: Not on file   Transportation Needs: Not on file   Physical Activity: Not on file   Stress: Not on file   Social Connections: Not on file   Intimate Partner Violence: Not on file   Housing Stability: Not on file     Family History:       Problem Relation Age of Onset    No Known Problems Father     No Known Problems Mother      Medications Prior to Admission:  Medications Prior to Admission: Prenatal Vit-Fe Fumarate-FA (PRENATAL VITAMINS) 28-0.8 MG TABS, Take 1 tablet by mouth daily    REVIEW OF SYSTEMS:    CONSTITUTIONAL:  negative  RESPIRATORY:  negative  CARDIOVASCULAR:  negative  GASTROINTESTINAL:  negative  ALLERGIC/IMMUNOLOGIC:  negative  NEUROLOGICAL:  negative  BEHAVIOR/PSYCH:  negative    PHYSICAL EXAM:  There were no vitals filed for this visit. General appearance:  awake, alert, cooperative, no apparent distress, and appears stated age  Neurologic:  Awake, alert, oriented to name, place and time. Lungs:  No increased work of breathing, good air exchange  Abdomen:  Soft, non tender, gravid, consistent with her gestational age  Fetal heart rate:  Reassuring.   Pelvis:  deferred  Contraction frequency:  not lauren     Membranes:  Intact    ASSESSMENT AND PLAN:  -FHTs detected right away in the high 140s  -will get NST, if reactive patient can be discharged home with precautions

## 2022-10-11 NOTE — DISCHARGE INSTRUCTIONS
Semanas 22 a 26 de taveras embarazo: Instrucciones de cuidado  Weeks 22 to 26 of Your Pregnancy: Care Instructions  Los pulmones de taveras bebé están preparándose para la respiración. Taveras bebé puede responder a taveras voz. Taveras bebé probablemente gire menos y patee o se sacuda más. Las sacudidas pueden significar que taveras bebé tiene hipo. Piense en asistir a clases de parto. Y comience a pensar en si desea recibir analgésicos antonia el trabajo de [place]y. En la próxima jaylyn con taveras médico, es posible que le gagan vito prueba de azúcar medina en la nora que se presenta por primera vez antonia el embarazo (diabetes gestacional). Esta afección puede causar problemas para usted y taveras bebé. Para aliviar las [address] de las patadas de taveras bebé   Cambie de posición. Inhale profundamente mientras levanta el brazo por encima de la janee y exhale mientras bertha caer el brazo. Para aliviar o reducir la [employer], los tobillos, las tish y los dedos   [place]. Evite alimentos con alto contenido de sal, elayne las benoit fritas de [place]. Eleve los pies, y duerma con almohadas debajo de los pies. No esté de pie por largos períodos de tiempo. No use zapatos apretados. Use medias de compresión. Ejercicios de Kegel para evitar escapes de [employer] elayne si estuviera tratando de no ventosear. Debe tener inmóviles el abdomen, las piernas y las nalgas. Mantenga la contracción por 3 segundos, y luego relaje por entre 5 y 8 segundos. Añada 1 humberto cada semana hasta que pueda contraer por 10 segundos. Repita el ejercicio 10 veces por sesión. Donna de 3 a 8 sesiones al día. Si estos ejercicios le causan dolor, deje de hacerlos y hable con taveras médico.  La atención de seguimiento es vito parte clave de taveras tratamiento y seguridad. Asegúrese de hacer y acudir a todas las citas, y llame a taveras médico si está teniendo problemas.  También es vito buena idea saber los [place] de deborah exámenes y [employer] lista de los [place] melyssa. ¿Dónde puede encontrar más información en inglés? Jolynn Copier a [email or web address] e ingrese a taveras cuenta de Clementine. Simonra Brine G264 en el cuadro \"Search Health Information\" para más información (en inglés) sobre \"Semanas 25 a 32 de taveras embarazo: Instrucciones de cuidado. \"     Si no tiene vito cuenta, loreta polo en el enlace \"Sign Up Now\". Revisado: 23 febrero, 2022               Versión del contenido: 13.4  © 0736-9735 Healthwise, [employer]. Las instrucciones de cuidado fueron adaptadas bajo licencia por [facility] CARE ([facility]). Si usted tiene [place] Thompson afección médica o sobre estas instrucciones, siempre pregunte a taveras profesional de kylee. [employer], [employer] niega toda garantía o responsabilidad por taveras uso de esta información.

## 2022-10-11 NOTE — PROGRESS NOTES
Patient given verbal and written discharge instructions with standard labor precautions instructed to keep follow up appointment with physician. Patient verbalizes understanding states no questions or concerns. Patient ambulatory off unit with family member.    #[id number] used

## 2022-10-12 ENCOUNTER — TELEPHONE (OUTPATIENT)
Dept: OBGYN | Age: 22
End: 2022-10-12

## 2022-10-13 ENCOUNTER — ROUTINE PRENATAL (OUTPATIENT)
Dept: OBGYN | Age: 22
End: 2022-10-13

## 2022-10-13 VITALS
WEIGHT: 106.8 LBS | SYSTOLIC BLOOD PRESSURE: 92 MMHG | BODY MASS INDEX: 20.86 KG/M2 | DIASTOLIC BLOOD PRESSURE: 58 MMHG | HEART RATE: 79 BPM

## 2022-10-13 DIAGNOSIS — Z3A.25 25 WEEKS GESTATION OF PREGNANCY: Primary | ICD-10-CM

## 2022-10-13 LAB
GLUCOSE URINE, POC: NEGATIVE
PROTEIN UA: NEGATIVE

## 2022-10-13 PROCEDURE — 99213 OFFICE O/P EST LOW 20 MIN: CPT | Performed by: MIDWIFE

## 2022-10-13 PROCEDURE — 81002 URINALYSIS NONAUTO W/O SCOPE: CPT | Performed by: MIDWIFE

## 2022-10-13 NOTE — PROGRESS NOTES
Centering In Pregnancy: Session 5: Comforts During Labor, Knowing When to 50 Uri Bey is a  female 25w4d    PAST OB HISTORY  OB History          1    Para        Term                AB        Living             SAB        IAB        Ectopic        Molar        Multiple        Live Births                    There was positive fetal movements. No contractions or leakage of fluid. She currently denies any bleeding, cramping, or contractions. The patient was seen and evaluated. Physical Exam:  Alert, cooperative, oriented to time and Place  Abdomen soft non tender  Skin warm and dry, no peripheral edema noted. See flow sheet       Genetic counseling and testing done/not done. Results reviewed. Glucola screening was/was not completed:  Results reviewed. Lab Results   Component Value Date/Time    GLUCOSE 75 2022 10:35 AM        The patients fetal anatomy ultrasound has been completed and reviewed with patient./ scheduled for. Assessment:  1. IUP 25w4d weeks  2. Size  = to dates  3. Patient Active Problem List    Diagnosis Date Noted    Circumvallate placenta 09/15/2022     Priority: Medium    Low-lying placenta 09/15/2022     Priority: Medium    Non-English speaking patient 09/15/2022     Priority: Medium    Subchorionic hematoma 09/15/2022     Priority: Medium    High risk teen pregnancy in second trimester 2022     Priority: Medium    Supervision of normal first pregnancy 2022     Priority: Medium      4. The patient is RH positive Rhogam Ordered no    Plan: 1. The patient will return to [facility] for her next visit in 2 weeks. 2. A 28 week lab panel will be/was ordered. This includes a HH, 1 hr GTT, U/A RPR, Direct Vargas. The patient is to complete this in the next two to four weeks. 3. The S/S of Pre-Eclampsia were reviewed with the patient in detail.  She is to report of these (Headache un relieved by rest, fluids, or tylenol, Increase in edema, epigastric pain, or visual disturbances) if they occur. 4. M notes and plan of care reviewed, follow up has been planned. 5. Total face to face time 15 minutes, greater than 50% spent on counseling the patient or coordinating her care, or discussing complications and problems related to her pregnancy. I answered all of her questions to her satisfaction.     REHANA Rees CNM

## 2022-10-13 NOTE — PROGRESS NOTES
Patient participated in [facility] 24 S 5   Topics: Comforts During Labor, When To Call -Early Labor   Enablers; Parenting magazine, Diapers size 2 and baby Wipes

## 2022-10-27 ENCOUNTER — ROUTINE PRENATAL (OUTPATIENT)
Dept: OBGYN | Age: 22
End: 2022-10-27

## 2022-10-27 VITALS
DIASTOLIC BLOOD PRESSURE: 65 MMHG | BODY MASS INDEX: 21.29 KG/M2 | WEIGHT: 109 LBS | SYSTOLIC BLOOD PRESSURE: 104 MMHG | HEART RATE: 85 BPM

## 2022-10-27 DIAGNOSIS — Z3A.27 27 WEEKS GESTATION OF PREGNANCY: Primary | ICD-10-CM

## 2022-10-27 LAB
GLUCOSE URINE, POC: NEGATIVE
PROTEIN UA: NEGATIVE

## 2022-10-27 PROCEDURE — 81002 URINALYSIS NONAUTO W/O SCOPE: CPT | Performed by: MIDWIFE

## 2022-10-27 PROCEDURE — 99213 OFFICE O/P EST LOW 20 MIN: CPT | Performed by: MIDWIFE

## 2022-10-27 NOTE — PROGRESS NOTES
Centering In Pregnancy: Session 6: ABC Safe Sleep, Labor Decisions, The Birth Experience     Gerardo Sorensen is a 25 y.o. female 29w1d      OB History    Para Term  AB Living   1 0 0 0 0 0   SAB IAB Ectopic Molar Multiple Live Births   0 0 0 0 0 0      # Outcome Date GA Lbr Star/2nd Weight Sex Delivery Anes PTL Lv   1 Current                 There was positive fetal movements. No contractions or leakage of fluid. She currently denies any symptoms of pre-eclampsia. The patient was seen and evaluated. Physical Exam:   Alert, cooperative, oriented to time and Place  Abdomen soft non tender  Skin warm and dry, no peripheral edema noted. See flow sheet  Mother's Prenatal Vitals  BP: 104/65  Weight: 109 lb (49.4 kg)  Heart Rate: 85  Patient Position: Sitting  Alb/Glu  Albumin: Negative  Glucose: Negative    The patient had her 28 week labs ordered. Glucola screening was not completed: Tdap Vaccine discussed. Assessment:  IUP at 27w4d  weeks  Size = to dates  List of High Risk Conditions - being managed by MFM  Patient Active Problem List   Diagnosis    High risk teen pregnancy in second trimester    Supervision of normal first pregnancy    Circumvallate placenta    Low-lying placenta    Non-English speaking patient    Subchorionic hematoma        Plan:  The patient will return to the office for her Centering visit in 2 weeks  The S/S of Pre-Eclampsia were reviewed with the patient in detail. Signs and symptoms of  labor as well as labor were reviewed. 4. MFM notes were reviewed and the follow up plan of care will be scheduled with  Centering in Pregnancy at [facility] (2 weeks)   T-Dap Vaccine (27-36 weeks): awaiting  Pt to have 28 week prenatal labs drawn Saturday 10/29/22  Total face to face time 15 minutes, greater than 50% spent on counseling the patient or coordinating her care, or discussing complications and problems related to her pregnancy.  I answered all of her questions to her satisfaction.     REHANA Rice CNM
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
## 2022-10-31 ENCOUNTER — HOSPITAL ENCOUNTER (OUTPATIENT)
Age: 22
Discharge: HOME | End: 2022-10-31

## 2022-10-31 DIAGNOSIS — Z3A.25 25 WEEKS GESTATION OF PREGNANCY: ICD-10-CM

## 2022-10-31 LAB
DAT POLYSPECIFIC: NORMAL
GLUCOSE TOLERANCE SCREEN 50G: 74 MG/DL (ref 70–140)
HCT VFR BLD CALC: 33.7 % (ref 34–48)
HEMOGLOBIN: 10.4 G/DL (ref 11.5–15.5)
MCH RBC QN AUTO: 24.8 PG (ref 26–35)
MCHC RBC AUTO-ENTMCNC: 30.9 % (ref 32–34.5)
MCV RBC AUTO: 80.4 FL (ref 80–99.9)
PDW BLD-RTO: 13 FL (ref 11.5–15)
PLATELET # BLD: 360 E9/L (ref 130–450)
PMV BLD AUTO: 10 FL (ref 7–12)
RBC # BLD: 4.19 E12/L (ref 3.5–5.5)
WBC # BLD: 7.2 E9/L (ref 4.5–11.5)

## 2022-10-31 PROCEDURE — 36415 COLL VENOUS BLD VENIPUNCTURE: CPT

## 2022-10-31 PROCEDURE — 82950 GLUCOSE TEST: CPT

## 2022-10-31 PROCEDURE — 86880 COOMBS TEST DIRECT: CPT

## 2022-10-31 PROCEDURE — 85027 COMPLETE CBC AUTOMATED: CPT

## 2022-11-09 ENCOUNTER — OFFICE VISIT (OUTPATIENT)
Dept: FAMILY MEDICINE CLINIC | Age: 22
End: 2022-11-09

## 2022-11-09 VITALS
DIASTOLIC BLOOD PRESSURE: 62 MMHG | TEMPERATURE: 98.2 F | RESPIRATION RATE: 18 BRPM | OXYGEN SATURATION: 100 % | SYSTOLIC BLOOD PRESSURE: 118 MMHG | BODY MASS INDEX: 21.79 KG/M2 | HEIGHT: 60 IN | HEART RATE: 103 BPM | WEIGHT: 111 LBS

## 2022-11-09 DIAGNOSIS — J06.9 VIRAL URI: Primary | ICD-10-CM

## 2022-11-09 DIAGNOSIS — Z33.1 PREGNANCY, INCIDENTAL: ICD-10-CM

## 2022-11-09 LAB
INFLUENZA A ANTIBODY: NORMAL
INFLUENZA B ANTIBODY: NORMAL
Lab: NORMAL
PERFORMING INSTRUMENT: NORMAL
QC PASS/FAIL: NORMAL
S PYO AG THROAT QL: NORMAL
SARS-COV-2, POC: NORMAL

## 2022-11-09 PROCEDURE — 87426 SARSCOV CORONAVIRUS AG IA: CPT | Performed by: PHYSICIAN ASSISTANT

## 2022-11-09 PROCEDURE — 87880 STREP A ASSAY W/OPTIC: CPT | Performed by: PHYSICIAN ASSISTANT

## 2022-11-09 PROCEDURE — 87804 INFLUENZA ASSAY W/OPTIC: CPT | Performed by: PHYSICIAN ASSISTANT

## 2022-11-09 PROCEDURE — 99213 OFFICE O/P EST LOW 20 MIN: CPT | Performed by: PHYSICIAN ASSISTANT

## 2022-11-09 NOTE — PROGRESS NOTES
Chief Complaint       Cough, Fever, and Pharyngitis (X 2 days, nothing OTC)      History of Present Illness   Source of history provided by:  patient and significant other. Joya Barroso is a 25 y.o. old female presenting to the walk in clinic for evaluation of a nonproductive cough, subjective fever, and sore throat x 2 days. Has not been taking anything OTC for symptomatic relief. Denies any wheezing, CP, SOB, or GI symptoms. Denies any hx of asthma or tobacco use. Denies any contact with any individuals with known COVID-19 infection or under investigation for COVID-19 infection. Of note, patient is currently 7 months pregnant. She denies any decrease in fetal movement, vaginal bleeding, or leaking of fluid. ROS    Unless otherwise stated in this report or unable to obtain because of the patient's clinical or mental status as evidenced by the medical record, this patients's positive and negative responses for Review of Systems, constitutional, psych, eyes, ENT, cardiovascular, respiratory, gastrointestinal, neurological, genitourinary, musculoskeletal, integument systems and systems related to the presenting problem are either stated in the preceding or were not pertinent or were negative for the symptoms and/or complaints related to the medical problem. Past Medical History:  has no past medical history on file. Past Surgical History:  has no past surgical history on file. Social History:  reports that she has never smoked. She has never used smokeless tobacco. She reports that she does not drink alcohol and does not use drugs. Family History: family history includes No Known Problems in her father and mother. Allergies: Patient has no known allergies.     Physical Exam         VS:  /62   Pulse (!) 103   Temp 98.2 °F (36.8 °C) (Temporal)   Resp 18   Ht 5' (1.524 m)   Wt 111 lb (50.3 kg)   LMP 04/08/2022 (Exact Date)   SpO2 100%   BMI 21.68 kg/m²    Oxygen Saturation Interpretation: Normal.    Constitutional:  Alert, development consistent with age. Ears:  External Ears: Bilateral pinna normal. TMs translucent without erythema or perforation bilaterally. Canals normal bilaterally without swelling or exudate  Nose: Mild congestion of the nasal mucosa. There is mild injection to middle turbinates bilaterally. Throat: Mild posterior pharyngeal erythema with mild post nasal drip present. No exudate or tonsillar hypertrophy noted. Neck:  Supple. There is no anterior cervical adenopathy. Lungs: CTAB without wheezes, rales, or rhonchi  Heart:  Regular rate and rhythm, normal heart sounds, without pathological murmurs, ectopy, gallops, or rubs. Skin:  Normal turgor. Warm, dry, without visible rash. Neurological:  Alert and oriented. Motor functions intact. Responds to verbal commands. Lab / Imaging Results   (All laboratory and radiology results have been personally reviewed by myself)  Labs:  Results for orders placed or performed in visit on 11/09/22   POCT rapid strep A   Result Value Ref Range    Strep A Ag None Detected None Detected   POCT COVID-19, Antigen   Result Value Ref Range    SARS-COV-2, POC Not-Detected Not Detected    Lot Number [id number]     QC Pass/Fail pass     Performing Instrument BD Veritor    POCT Influenza A/B   Result Value Ref Range    Influenza A Ab neg     Influenza B Ab neg        Imaging: All Radiology results interpreted by Radiologist unless otherwise noted. Assessment / Plan     Impression(s):  Bandar Cabrera was seen today for cough, fever and pharyngitis. Diagnoses and all orders for this visit:    Viral URI  -     POCT rapid strep A  -     POCT COVID-19, Antigen  -     POCT Influenza A/B    Pregnancy, incidental      Disposition:  Disposition: Discharge to home. Rapid strep, COVID-19, and influenza are all negative in office today. Pt advised that her illness is likely viral and should resolve with time and conservative measures. Increase fluids and rest. Symptomatic relief discussed. PCP in 5-7 days if symptoms persist. ED sooner if symptoms worsen or change. Red flag symptoms discussed. ER immediately with decrease in fetal movement, vaginal bleeding, leaking of fluid, severe or worsening cough, dyspnea, chest pain, lower extremity edema, dizziness, syncope, refractory vomiting, or high/refractory fever. Pt is in agreement with this care plan. All questions answered. Mayank España PA-C    **This report was transcribed using voice recognition software. Every effort was made to ensure accuracy; however, inadvertent computerized transcription errors may be present.

## 2022-11-10 ENCOUNTER — TELEPHONE (OUTPATIENT)
Dept: OBGYN | Age: 22
End: 2022-11-10

## 2022-11-10 NOTE — TELEPHONE ENCOUNTER
PER PROVIDER Stalin :    Patient can take tylenol for pain, get plenty of rest and increase fluid intake. If things progress go to the nearest emergency department.

## 2022-11-10 NOTE — TELEPHONE ENCOUNTER
Patient was not feeling well and mentioned to [facility]  that she went to walk-in care and they gave her a script for medicine that she cannot take during pregnancy. Patient cancelled appointment for today and I will consult with Provider Everett Menjivar over what patient can do for relief.

## 2022-11-28 ENCOUNTER — ROUTINE PRENATAL (OUTPATIENT)
Dept: OBGYN | Age: 22
End: 2022-11-28

## 2022-11-28 VITALS
BODY MASS INDEX: 22.03 KG/M2 | WEIGHT: 112.8 LBS | HEART RATE: 88 BPM | DIASTOLIC BLOOD PRESSURE: 67 MMHG | SYSTOLIC BLOOD PRESSURE: 106 MMHG

## 2022-11-28 DIAGNOSIS — Z78.9 NON-ENGLISH SPEAKING PATIENT: ICD-10-CM

## 2022-11-28 DIAGNOSIS — Z3A.32 32 WEEKS GESTATION OF PREGNANCY: Primary | ICD-10-CM

## 2022-11-28 LAB
GLUCOSE URINE, POC: NEGATIVE
PROTEIN UA: NEGATIVE

## 2022-11-28 PROCEDURE — 81002 URINALYSIS NONAUTO W/O SCOPE: CPT | Performed by: OBSTETRICS & GYNECOLOGY

## 2022-11-28 PROCEDURE — 99212 OFFICE O/P EST SF 10 MIN: CPT | Performed by: OBSTETRICS & GYNECOLOGY

## 2022-11-28 PROCEDURE — 99213 OFFICE O/P EST LOW 20 MIN: CPT | Performed by: OBSTETRICS & GYNECOLOGY

## 2022-11-28 NOTE — PROGRESS NOTES
Patient received car seat, diapers and wipes. Today's session was on Car Seat Safety Visual Training by Resource Mother Morgan Runner, What to bring to the Hospital, Nutrition Review. Signs of When to go to the Hospital. What to expect after Delivery. Patient will receive pack-n-play on her next visit.

## 2022-11-28 NOTE — PROGRESS NOTES
Here for Centering pregnancy. Denies any problems at all. Baby active. Some BH contractions. Needs to increase water intake. No LOF. RTC 2 weeks  Ipad used for translation.

## 2022-12-15 ENCOUNTER — HOSPITAL ENCOUNTER (OUTPATIENT)
Age: 22
Discharge: HOME | End: 2022-12-15
Attending: OBSTETRICS & GYNECOLOGY | Admitting: OBSTETRICS & GYNECOLOGY

## 2022-12-15 VITALS
DIASTOLIC BLOOD PRESSURE: 63 MMHG | HEART RATE: 80 BPM | RESPIRATION RATE: 18 BRPM | SYSTOLIC BLOOD PRESSURE: 109 MMHG | TEMPERATURE: 97.8 F

## 2022-12-15 PROBLEM — Z3A.34 34 WEEKS GESTATION OF PREGNANCY: Status: ACTIVE | Noted: 2022-12-15

## 2022-12-15 PROBLEM — O47.03 PRETERM UTERINE CONTRACTIONS IN THIRD TRIMESTER, ANTEPARTUM: Status: ACTIVE | Noted: 2022-12-15

## 2022-12-15 PROBLEM — R35.0 URINARY FREQUENCY: Status: ACTIVE | Noted: 2022-12-15

## 2022-12-15 LAB
BACTERIA: ABNORMAL /HPF
BILIRUBIN URINE: NEGATIVE
BLOOD, URINE: ABNORMAL
CLARITY: CLEAR
COLOR: ABNORMAL
EPITHELIAL CELLS, UA: ABNORMAL /HPF
GLUCOSE URINE: NEGATIVE MG/DL
KETONES, URINE: NEGATIVE MG/DL
LEUKOCYTE ESTERASE, URINE: ABNORMAL
NITRITE, URINE: NEGATIVE
PH UA: 7 (ref 5–9)
PROTEIN UA: NEGATIVE MG/DL
RBC UA: ABNORMAL /HPF (ref 0–2)
SPECIFIC GRAVITY UA: 1.01 (ref 1–1.03)
UROBILINOGEN, URINE: 0.2 E.U./DL
WBC UA: ABNORMAL /HPF (ref 0–5)

## 2022-12-15 PROCEDURE — 81001 URINALYSIS AUTO W/SCOPE: CPT

## 2022-12-15 PROCEDURE — 87088 URINE BACTERIA CULTURE: CPT

## 2022-12-15 PROCEDURE — 99202 OFFICE O/P NEW SF 15 MIN: CPT

## 2022-12-15 RX ORDER — NITROFURANTOIN 25; 75 MG/1; MG/1
100 CAPSULE ORAL 2 TIMES DAILY
Qty: 14 CAPSULE | Refills: 0 | Status: SHIPPED | OUTPATIENT
Start: 2022-12-15 | End: 2022-12-22

## 2022-12-15 NOTE — PROGRESS NOTES
Patient seen  Comfortable  Bp= 109/63  Cx closed/thick/high on my exam  Fht's reactive  Intermittent ctx's  Discharged home on macrobid

## 2022-12-15 NOTE — H&P
Department of Obstetrics and Gynecology  Physician Assistant Obstetrics History and Physical      HISTORY OF PRESENT ILLNESS:      The patient is a 23 y.o.  1 parity 0 at 29 weeks' 4 days' gestation presents to L&D from home c/o contractions that began at 05:00 am. Approximately 1 an hour to 5 contractions in an hour.  utilized. No other obstetric complaints. Reports urinating every 30 to 60 minutes. Current obstetric history is significant for:  Non-English speaking patient  Previous subchorionic hematoma, resolved  Previous Circumvallate placenta, resolved  Previous Low-lying placenta- resolved    Estimated Due Date:  2023  Contractions: Yes  Leaking of fluid: No  Bleeding:  No  Perceived fetal movement: Good        PAST OB HISTORY:  OB History    Para Term  AB Living   1             SAB IAB Ectopic Molar Multiple Live Births                    # Outcome Date GA Lbr Star/2nd Weight Sex Delivery Anes PTL Lv   1 Current                Past Medical History:  Denies hypertension, diabetes, asthma, cardiac or thyroid disease  History reviewed. No pertinent past medical history. Past Surgical History:    History reviewed. No pertinent surgical history. Social History:    Reports that she has never smoked. She has never used smokeless tobacco. She reports that she does not drink alcohol and does not use drugs.      Family History   Problem Relation Age of Onset    No Known Problems Father     No Known Problems Mother        Blood type: B positive  Antibody screen:   Lab Results   Component Value Date    LABANTI NEG 2022     CBC:   Lab Results   Component Value Date    WBC 7.2 10/31/2022    HGB 10.4 (L) 10/31/2022    HCT 33.7 (L) 10/31/2022    MCV 80.4 10/31/2022     10/31/2022      Rubella: Immune  RPR: Non-reactive  Hepatitis B Surface Antigen:  Non-reactive  HIV: Non-reactive  Gonorrhea: Negative  Chlamydia: Negative  Group B Strep: No results found for: GBSCX      Medications Prior to Admission:  Medications Prior to Admission: Prenatal Vit-Fe Fumarate-FA (PRENATAL VITAMINS) 28-0.8 MG TABS, Take 1 tablet by mouth daily    Allergies:  Patient has no known allergies. ROS:  Const: No fever, chills, night sweats, no recent unexplained weight gain/loss  HEENT: No blurred vision, double vision; no ear problems; no sore throat, congestion; no running nose. Resp: No cough, no sputum, no pleuritic chest pain, no sob  Cardio: No chest pain, no exertional dyspnea, no PND, no orthopnea, no palpitation, no leg swelling. GI: No dysphagia, no reflux; no abdominal pain, no n/v; no c/d. No hematochezia    : (+) frequency. No dysuria, hesitancy; no hematuria  MSK: no joint pain, no myalgia, no change in ROM  Neuro: no focal weakness in extremities, no slurred speech, no double vision, no numbness or tingling in extremities  Endo: no heat/cold intolerance, no polyphagia, polydipsia or polyuria  Hem: no increased bleeding, no bruising, no lymphadenopathy  Skin: no skin changes  Psych: no depressed mood, no suicidal ideation  Pertinent +'s & -'s addressed in HPI    PHYSICAL EXAM:  /63   Pulse 80   Temp 97.8 °F (36.6 °C) (Oral)   Resp 18   LMP 2022 (Exact Date)     General appearance: Comfortable  Lungs:  CTA bilaterally, good excursion  Heart:  Regular Rate & Rhythm, no murmur noted  Abdomen:  Soft, non-tender, gravid  Uterus: soft, nontender  Fetal heart rate:  Baseline Heart Rate 130; variability: moderate;  accelerations: present;  decelerations: absent  Cervix:  DILATION: Closed  EFFACEMENT:  Thick  STATION:  High  Contraction frequency:  x 2 in 1 hour, mild uterine irritability noted  Membranes:  Intact  Back: (-) CVA tenderness.   Extremities: (-) edema       ASSESSMENT:   22 yo female  IUP at 29 weeks' 4 days' gestation    R/o PTL, R/o UTI         Plan: Orders per Dr. Peña Sers:  [facility]  Outpatient  U/a with culture        Electronically signed by Meryl Funk PA-C on 12/15/2022 at 2:00 PM

## 2022-12-18 LAB
ORGANISM: ABNORMAL
URINE CULTURE, ROUTINE: ABNORMAL
URINE CULTURE, ROUTINE: ABNORMAL

## 2022-12-22 ENCOUNTER — ROUTINE PRENATAL (OUTPATIENT)
Dept: OBGYN | Age: 22
End: 2022-12-22

## 2022-12-22 VITALS
DIASTOLIC BLOOD PRESSURE: 61 MMHG | WEIGHT: 115 LBS | HEART RATE: 98 BPM | BODY MASS INDEX: 22.46 KG/M2 | SYSTOLIC BLOOD PRESSURE: 107 MMHG

## 2022-12-22 DIAGNOSIS — Z34.93 THIRD TRIMESTER PREGNANCY: ICD-10-CM

## 2022-12-22 DIAGNOSIS — Z34.93 PRENATAL CARE, THIRD TRIMESTER: ICD-10-CM

## 2022-12-22 DIAGNOSIS — Z34.93 PRENATAL CARE, THIRD TRIMESTER: Primary | ICD-10-CM

## 2022-12-22 LAB
GLUCOSE URINE, POC: NEGATIVE
PROTEIN UA: NEGATIVE

## 2022-12-22 PROCEDURE — 81002 URINALYSIS NONAUTO W/O SCOPE: CPT | Performed by: MIDWIFE

## 2022-12-22 PROCEDURE — 99213 OFFICE O/P EST LOW 20 MIN: CPT | Performed by: MIDWIFE

## 2022-12-22 NOTE — PROGRESS NOTES
Centering In Pregnancy: Session 10: [place] [facility], New Mom & New Baby Knowing When to Call. Barron San is a 23 y.o. female 29w2d      OB History    Para Term  AB Living   1 0 0 0 0 0   SAB IAB Ectopic Molar Multiple Live Births   0 0 0 0 0 0      # Outcome Date GA Lbr Star/2nd Weight Sex Delivery Anes PTL Lv   1 Current                   There was positive fetal movements. No contractions or leakage of fluid. She currently denies any symptoms of pre-eclampsia. The patient was seen and evaluated. Pt was seen in LD last week for UTI. Treated with antibiotic. Pt states she is feeling better. She continues with course of antibiotic  Physical Exam:   Alert, cooperative, oriented to time and Place  Abdomen soft non tender  Skin warm and dry, no peripheral edema noted. See flow sheet  Mother's Prenatal Vitals  BP: 107/61  Weight: 115 lb (52.2 kg)  Heart Rate: 98  Patient Position: Sitting      Assessment:  IUP at 35w4d  weeks  Size = to dates  List of High Risk Conditions - being managed by MFM  Patient Active Problem List   Diagnosis    High risk teen pregnancy in second trimester    Supervision of normal first pregnancy    Circumvallate placenta    Low-lying placenta    Non-English speaking patient    Subchorionic hematoma     uterine contractions in third trimester, antepartum    34 weeks gestation of pregnancy    Urinary frequency        Plan:  The patient will return to the office for her Centering visit in 2 weeks  The S/S of Pre-Eclampsia were reviewed with the patient in detail. Signs and symptoms of  labor as well as labor were reviewed.   4. MFM notes were reviewed and the follow up plan of care will be scheduled with  Centering in Pregnancy at [facility] (2 weeks)   T-Dap Vaccine (27-36 weeks): awaiting  Total face to face time 15 minutes, greater than 50% spent on counseling the patient or coordinating her care, or discussing complications and problems related to her pregnancy. I answered all of her questions to her satisfaction.     REHANA Martinez CNM

## 2022-12-22 NOTE — PROGRESS NOTES
Patient received pack-n-play, diapers size 6 and infant sleeper. Patient loved Centering Group Sessions. Today's discussion was on  Care, Knowing When the Doctor after being discharged from hospital. We also discussed delayed cord clamping detail with patient. Today's session was facilitated by Toña Moffett.

## 2022-12-22 NOTE — PROGRESS NOTES
Urine for glucose and protein obtained with negative results.    GBS vaginal obtained, labeled and sent to lab

## 2022-12-25 LAB — GROUP B STREP CULTURE: NORMAL

## 2022-12-27 NOTE — PROGRESS NOTES
SINCERE Addended Note-    On September 15, 2022, this patient had an early pregnancy ultrasound diagnosis of circumvallate placenta, low-lying placenta, and subchorionic hematoma. An ultrasound was repeated on 2022 by Dr. Lauro Swanson and he reported that these placental problems had resolved. She has had no bleeding issues in this pregnancy. Since , there have been 3 documented notes reporting that the patient's active problems related to her placenta were resolved. Her most recent  visit indicates that her active problems being managed in this pregnancy are circumvallate placenta, low- lying placenta, and subchorionic hematoma. In my chart review of this patient, she does not have these placental problems as they have resolved.     Emily Salgado MD

## 2023-01-05 ENCOUNTER — ROUTINE PRENATAL (OUTPATIENT)
Dept: OBGYN | Age: 23
End: 2023-01-05

## 2023-01-05 VITALS
HEART RATE: 76 BPM | DIASTOLIC BLOOD PRESSURE: 70 MMHG | SYSTOLIC BLOOD PRESSURE: 114 MMHG | BODY MASS INDEX: 23.05 KG/M2 | WEIGHT: 118 LBS

## 2023-01-05 DIAGNOSIS — Z34.93 PRENATAL CARE, THIRD TRIMESTER: Primary | ICD-10-CM

## 2023-01-05 PROBLEM — Z3A.34 34 WEEKS GESTATION OF PREGNANCY: Status: RESOLVED | Noted: 2022-12-15 | Resolved: 2023-01-05

## 2023-01-05 LAB
GLUCOSE URINE, POC: NEGATIVE
PROTEIN UA: NEGATIVE

## 2023-01-05 PROCEDURE — 99212 OFFICE O/P EST SF 10 MIN: CPT | Performed by: MIDWIFE

## 2023-01-05 PROCEDURE — 81002 URINALYSIS NONAUTO W/O SCOPE: CPT | Performed by: MIDWIFE

## 2023-01-05 PROCEDURE — 99213 OFFICE O/P EST LOW 20 MIN: CPT | Performed by: MIDWIFE

## 2023-01-05 NOTE — PROGRESS NOTES
Madeline Domingo is a  female 37w4d  OB History    Para Term  AB Living   1 0 0 0 0 0   SAB IAB Ectopic Molar Multiple Live Births   0 0 0 0 0 0      # Outcome Date GA Lbr Star/2nd Weight Sex Delivery Anes PTL Lv   1 Current                 There was positive fetal movements. No contractions or leakage of fluid. She currently denies any symptoms of pre-eclampsia. Pt reports contraction off and on since     The patient was seen and evaluated. Physical Exam:  Mother's Prenatal Vitals  BP: 114/70  Weight: 118 lb (53.5 kg)  Heart Rate: 76  Patient Position: Sitting  Alb/Glu  Albumin: Negative  Glucose: Negative  Prenatal Fetal Information  Fetal HR: 135  Movement: Present  Body mass index is 23.05 kg/m². Alert, cooperative, oriented to time and place  Abdomen soft, non-tender  Skin warm and dry, no peripheral edema noted. closed cm    Group Beta Strep collection was completed. Yes  Group B strep: negative    Allergies: Allergies as of 2023    (No Known Allergies)       Assessment:  IUP at 37w4d weeks  Size = to dates  List of High Risk Conditions - being managed by MFM  Patient Active Problem List   Diagnosis    High risk teen pregnancy in second trimester    Supervision of normal first pregnancy    Circumvallate placenta    Low-lying placenta    Non-English speaking patient    Subchorionic hematoma     uterine contractions in third trimester, antepartum    Urinary frequency        Plan:  The patient will return in 1 week or PRN. Signs and symptoms of labor were reviewed including but not limited to: Decreased fetal movement, vaginal bleeding, leakage of fluid, trauma, or any instance where she feels 911 should be utilized. The patient was counseled on Labor & Delivery. Route of delivery and counseling on vaginal, operative vaginal, and  sections were completed with the risks of each to both the patient as well as her baby.    The patient was counseled on types of analgesia during labor and is considering either Regional or IV medication including the risks, benefits and alternatives. M notes were reviewed and the follow up plan of care will be scheduled with  Centering in Pregnancy at [facility] [place] (2 weeks) and [address]  The benefits of Breast feeding were reviewed and questions answered. Total face to face time 15 minutes, greater than 50% spent on counseling the patient or coordinating her care, or discussing complications and problems related to her pregnancy. I answered all of her questions to her satisfaction.       Cheyenne Nelson, REHANA - [place]

## 2023-01-05 NOTE — PROGRESS NOTES
Patient here today for routine prenatal visit at 37w4d. Patient reports feeling contractions every 5-10 minutes but denies any leaking of fluid or vaginal bleeding. Perceives good fetal movement. Pelvic exam done by JM Pineda. Discharge instructions have been discussed with the patient by JM Pineda and patient voiced understanding of plan of care. Patient advised to call our office with any questions or concerns. [facility]  # [id number] utilized for entire visit.

## 2023-01-09 ENCOUNTER — ANESTHESIA EVENT (OUTPATIENT)
Dept: LABOR AND DELIVERY | Age: 23
End: 2023-01-09

## 2023-01-09 ENCOUNTER — ANESTHESIA (OUTPATIENT)
Dept: LABOR AND DELIVERY | Age: 23
End: 2023-01-09

## 2023-01-09 ENCOUNTER — HOSPITAL ENCOUNTER (INPATIENT)
Age: 23
LOS: 2 days | Discharge: HOME | End: 2023-01-11
Attending: OBSTETRICS & GYNECOLOGY | Admitting: OBSTETRICS & GYNECOLOGY

## 2023-01-09 PROBLEM — O42.90 AMNIOTIC FLUID LEAKING: Status: ACTIVE | Noted: 2023-01-09

## 2023-01-09 PROBLEM — Z3A.38 38 WEEKS GESTATION OF PREGNANCY: Status: ACTIVE | Noted: 2023-01-09

## 2023-01-09 LAB
ABO/RH: NORMAL
AMNISURE, POC: POSITIVE
AMPHETAMINE SCREEN, URINE: NOT DETECTED
ANTIBODY SCREEN: NORMAL
BARBITURATE SCREEN URINE: NOT DETECTED
BENZODIAZEPINE SCREEN, URINE: NOT DETECTED
BUPRENORPHINE URINE: NOT DETECTED
CANNABINOID SCREEN URINE: NOT DETECTED
COCAINE METABOLITE SCREEN URINE: NOT DETECTED
FENTANYL SCREEN, URINE: NOT DETECTED
HCT VFR BLD CALC: 31.5 % (ref 34–48)
HEMOGLOBIN: 9.4 G/DL (ref 11.5–15.5)
Lab: NORMAL
Lab: NORMAL
MCH RBC QN AUTO: 21.1 PG (ref 26–35)
MCHC RBC AUTO-ENTMCNC: 29.8 % (ref 32–34.5)
MCV RBC AUTO: 70.6 FL (ref 80–99.9)
METHADONE SCREEN, URINE: NOT DETECTED
NEGATIVE QC PASS/FAIL: NORMAL
OPIATE SCREEN URINE: NOT DETECTED
OXYCODONE URINE: NOT DETECTED
PDW BLD-RTO: 15.9 FL (ref 11.5–15)
PHENCYCLIDINE SCREEN URINE: NOT DETECTED
PLATELET # BLD: 346 E9/L (ref 130–450)
PMV BLD AUTO: 9.8 FL (ref 7–12)
POSITIVE QC PASS/FAIL: NORMAL
RBC # BLD: 4.46 E12/L (ref 3.5–5.5)
WBC # BLD: 11 E9/L (ref 4.5–11.5)

## 2023-01-09 PROCEDURE — 3700000025 EPIDURAL BLOCK

## 2023-01-09 PROCEDURE — 7200000001 HC VAGINAL DELIVERY

## 2023-01-09 PROCEDURE — 86901 BLOOD TYPING SEROLOGIC RH(D): CPT

## 2023-01-09 PROCEDURE — 0KQM0ZZ REPAIR PERINEUM MUSCLE, OPEN APPROACH: ICD-10-PCS | Performed by: FAMILY MEDICINE

## 2023-01-09 PROCEDURE — 1220000000 HC SEMI PRIVATE OB R&B

## 2023-01-09 PROCEDURE — 2500000003 HC RX 250 WO HCPCS

## 2023-01-09 PROCEDURE — 88307 TISSUE EXAM BY PATHOLOGIST: CPT

## 2023-01-09 PROCEDURE — 51701 INSERT BLADDER CATHETER: CPT

## 2023-01-09 PROCEDURE — 80307 DRUG TEST PRSMV CHEM ANLYZR: CPT

## 2023-01-09 PROCEDURE — 3700000025 EPIDURAL BLOCK: Performed by: ANESTHESIOLOGY

## 2023-01-09 PROCEDURE — 6370000000 HC RX 637 (ALT 250 FOR IP): Performed by: FAMILY MEDICINE

## 2023-01-09 PROCEDURE — 36415 COLL VENOUS BLD VENIPUNCTURE: CPT

## 2023-01-09 PROCEDURE — 86900 BLOOD TYPING SEROLOGIC ABO: CPT

## 2023-01-09 PROCEDURE — 86850 RBC ANTIBODY SCREEN: CPT

## 2023-01-09 PROCEDURE — 6360000002 HC RX W HCPCS: Performed by: FAMILY MEDICINE

## 2023-01-09 PROCEDURE — 59409 OBSTETRICAL CARE: CPT | Performed by: FAMILY MEDICINE

## 2023-01-09 PROCEDURE — 6360000002 HC RX W HCPCS

## 2023-01-09 PROCEDURE — 85027 COMPLETE CBC AUTOMATED: CPT

## 2023-01-09 PROCEDURE — APPNB60 APP NON BILLABLE TIME 46-60 MINS: Performed by: FAMILY MEDICINE

## 2023-01-09 PROCEDURE — 2500000003 HC RX 250 WO HCPCS: Performed by: ANESTHESIOLOGY

## 2023-01-09 RX ORDER — METHYLERGONOVINE MALEATE 0.2 MG/ML
200 INJECTION INTRAVENOUS PRN
Status: CANCELLED | OUTPATIENT
Start: 2023-01-09

## 2023-01-09 RX ORDER — CARBOPROST TROMETHAMINE 250 UG/ML
250 INJECTION, SOLUTION INTRAMUSCULAR PRN
Status: CANCELLED | OUTPATIENT
Start: 2023-01-09

## 2023-01-09 RX ORDER — ACETAMINOPHEN 500 MG
1000 TABLET ORAL EVERY 8 HOURS SCHEDULED
Status: DISCONTINUED | OUTPATIENT
Start: 2023-01-09 | End: 2023-01-11 | Stop reason: HOSPADM

## 2023-01-09 RX ORDER — DIPHENHYDRAMINE HCL 25 MG
25 TABLET ORAL EVERY 4 HOURS PRN
Status: CANCELLED | OUTPATIENT
Start: 2023-01-09

## 2023-01-09 RX ORDER — SODIUM CHLORIDE 0.9 % (FLUSH) 0.9 %
5-40 SYRINGE (ML) INJECTION PRN
Status: DISCONTINUED | OUTPATIENT
Start: 2023-01-09 | End: 2023-01-11 | Stop reason: HOSPADM

## 2023-01-09 RX ORDER — TERBUTALINE SULFATE 1 MG/ML
0.25 INJECTION, SOLUTION SUBCUTANEOUS ONCE
Status: COMPLETED | OUTPATIENT
Start: 2023-01-09 | End: 2023-01-09

## 2023-01-09 RX ORDER — SODIUM CHLORIDE, SODIUM LACTATE, POTASSIUM CHLORIDE, AND CALCIUM CHLORIDE .6; .31; .03; .02 G/100ML; G/100ML; G/100ML; G/100ML
500 INJECTION, SOLUTION INTRAVENOUS PRN
Status: DISCONTINUED | OUTPATIENT
Start: 2023-01-09 | End: 2023-01-09 | Stop reason: HOSPADM

## 2023-01-09 RX ORDER — LIDOCAINE HYDROCHLORIDE 10 MG/ML
INJECTION, SOLUTION INFILTRATION; PERINEURAL
Status: COMPLETED
Start: 2023-01-09 | End: 2023-01-09

## 2023-01-09 RX ORDER — SODIUM CHLORIDE 9 MG/ML
25 INJECTION, SOLUTION INTRAVENOUS PRN
Status: DISCONTINUED | OUTPATIENT
Start: 2023-01-09 | End: 2023-01-09 | Stop reason: HOSPADM

## 2023-01-09 RX ORDER — SODIUM CHLORIDE 0.9 % (FLUSH) 0.9 %
5-40 SYRINGE (ML) INJECTION PRN
Status: CANCELLED | OUTPATIENT
Start: 2023-01-09

## 2023-01-09 RX ORDER — SODIUM CHLORIDE 0.9 % (FLUSH) 0.9 %
5-40 SYRINGE (ML) INJECTION EVERY 12 HOURS SCHEDULED
Status: DISCONTINUED | OUTPATIENT
Start: 2023-01-09 | End: 2023-01-09 | Stop reason: HOSPADM

## 2023-01-09 RX ORDER — SODIUM CHLORIDE, SODIUM LACTATE, POTASSIUM CHLORIDE, AND CALCIUM CHLORIDE .6; .31; .03; .02 G/100ML; G/100ML; G/100ML; G/100ML
500 INJECTION, SOLUTION INTRAVENOUS PRN
Status: CANCELLED | OUTPATIENT
Start: 2023-01-09

## 2023-01-09 RX ORDER — MODIFIED LANOLIN
OINTMENT (GRAM) TOPICAL PRN
Status: DISCONTINUED | OUTPATIENT
Start: 2023-01-09 | End: 2023-01-11 | Stop reason: HOSPADM

## 2023-01-09 RX ORDER — METHYLERGONOVINE MALEATE 0.2 MG/ML
200 INJECTION INTRAVENOUS PRN
Status: DISCONTINUED | OUTPATIENT
Start: 2023-01-09 | End: 2023-01-09 | Stop reason: HOSPADM

## 2023-01-09 RX ORDER — SODIUM CHLORIDE, SODIUM LACTATE, POTASSIUM CHLORIDE, CALCIUM CHLORIDE 600; 310; 30; 20 MG/100ML; MG/100ML; MG/100ML; MG/100ML
INJECTION, SOLUTION INTRAVENOUS CONTINUOUS
Status: DISCONTINUED | OUTPATIENT
Start: 2023-01-09 | End: 2023-01-11 | Stop reason: HOSPADM

## 2023-01-09 RX ORDER — IBUPROFEN 600 MG/1
600 TABLET ORAL EVERY 8 HOURS SCHEDULED
Status: DISCONTINUED | OUTPATIENT
Start: 2023-01-09 | End: 2023-01-11 | Stop reason: HOSPADM

## 2023-01-09 RX ORDER — DOCUSATE SODIUM 100 MG/1
100 CAPSULE, LIQUID FILLED ORAL 2 TIMES DAILY
Status: DISCONTINUED | OUTPATIENT
Start: 2023-01-09 | End: 2023-01-09 | Stop reason: SDUPTHER

## 2023-01-09 RX ORDER — MISOPROSTOL 200 UG/1
800 TABLET ORAL PRN
Status: DISCONTINUED | OUTPATIENT
Start: 2023-01-09 | End: 2023-01-09 | Stop reason: HOSPADM

## 2023-01-09 RX ORDER — MISOPROSTOL 200 UG/1
800 TABLET ORAL PRN
Status: CANCELLED | OUTPATIENT
Start: 2023-01-09

## 2023-01-09 RX ORDER — SODIUM CHLORIDE 0.9 % (FLUSH) 0.9 %
5-40 SYRINGE (ML) INJECTION EVERY 12 HOURS SCHEDULED
Status: CANCELLED | OUTPATIENT
Start: 2023-01-09

## 2023-01-09 RX ORDER — ONDANSETRON 2 MG/ML
4 INJECTION INTRAMUSCULAR; INTRAVENOUS EVERY 6 HOURS PRN
Status: DISCONTINUED | OUTPATIENT
Start: 2023-01-09 | End: 2023-01-09 | Stop reason: HOSPADM

## 2023-01-09 RX ORDER — SODIUM CHLORIDE, SODIUM LACTATE, POTASSIUM CHLORIDE, CALCIUM CHLORIDE 600; 310; 30; 20 MG/100ML; MG/100ML; MG/100ML; MG/100ML
INJECTION, SOLUTION INTRAVENOUS CONTINUOUS
Status: CANCELLED | OUTPATIENT
Start: 2023-01-09

## 2023-01-09 RX ORDER — CARBOPROST TROMETHAMINE 250 UG/ML
250 INJECTION, SOLUTION INTRAMUSCULAR PRN
Status: DISCONTINUED | OUTPATIENT
Start: 2023-01-09 | End: 2023-01-09 | Stop reason: HOSPADM

## 2023-01-09 RX ORDER — ACETAMINOPHEN 325 MG/1
650 TABLET ORAL EVERY 4 HOURS PRN
Status: CANCELLED | OUTPATIENT
Start: 2023-01-09

## 2023-01-09 RX ORDER — NALOXONE HYDROCHLORIDE 0.4 MG/ML
INJECTION, SOLUTION INTRAMUSCULAR; INTRAVENOUS; SUBCUTANEOUS PRN
Status: DISCONTINUED | OUTPATIENT
Start: 2023-01-09 | End: 2023-01-09 | Stop reason: HOSPADM

## 2023-01-09 RX ORDER — TERBUTALINE SULFATE 1 MG/ML
INJECTION, SOLUTION SUBCUTANEOUS
Status: COMPLETED
Start: 2023-01-09 | End: 2023-01-09

## 2023-01-09 RX ORDER — DOCUSATE SODIUM 100 MG/1
100 CAPSULE, LIQUID FILLED ORAL 2 TIMES DAILY
Status: CANCELLED | OUTPATIENT
Start: 2023-01-09

## 2023-01-09 RX ORDER — SODIUM CHLORIDE 0.9 % (FLUSH) 0.9 %
5-40 SYRINGE (ML) INJECTION EVERY 12 HOURS SCHEDULED
Status: DISCONTINUED | OUTPATIENT
Start: 2023-01-09 | End: 2023-01-11 | Stop reason: HOSPADM

## 2023-01-09 RX ORDER — DOCUSATE SODIUM 100 MG/1
100 CAPSULE, LIQUID FILLED ORAL 2 TIMES DAILY
Status: DISCONTINUED | OUTPATIENT
Start: 2023-01-09 | End: 2023-01-09 | Stop reason: HOSPADM

## 2023-01-09 RX ORDER — SODIUM CHLORIDE, SODIUM LACTATE, POTASSIUM CHLORIDE, AND CALCIUM CHLORIDE .6; .31; .03; .02 G/100ML; G/100ML; G/100ML; G/100ML
1000 INJECTION, SOLUTION INTRAVENOUS PRN
Status: CANCELLED | OUTPATIENT
Start: 2023-01-09

## 2023-01-09 RX ORDER — SODIUM CHLORIDE 9 MG/ML
INJECTION, SOLUTION INTRAVENOUS PRN
Status: DISCONTINUED | OUTPATIENT
Start: 2023-01-09 | End: 2023-01-11 | Stop reason: HOSPADM

## 2023-01-09 RX ORDER — ACETAMINOPHEN 650 MG
TABLET, EXTENDED RELEASE ORAL
Status: COMPLETED
Start: 2023-01-09 | End: 2023-01-09

## 2023-01-09 RX ORDER — SODIUM CHLORIDE, SODIUM LACTATE, POTASSIUM CHLORIDE, AND CALCIUM CHLORIDE .6; .31; .03; .02 G/100ML; G/100ML; G/100ML; G/100ML
1000 INJECTION, SOLUTION INTRAVENOUS PRN
Status: DISCONTINUED | OUTPATIENT
Start: 2023-01-09 | End: 2023-01-09 | Stop reason: HOSPADM

## 2023-01-09 RX ORDER — SODIUM CHLORIDE 0.9 % (FLUSH) 0.9 %
5-40 SYRINGE (ML) INJECTION PRN
Status: DISCONTINUED | OUTPATIENT
Start: 2023-01-09 | End: 2023-01-09 | Stop reason: HOSPADM

## 2023-01-09 RX ORDER — ONDANSETRON 2 MG/ML
4 INJECTION INTRAMUSCULAR; INTRAVENOUS EVERY 6 HOURS PRN
Status: DISCONTINUED | OUTPATIENT
Start: 2023-01-09 | End: 2023-01-11 | Stop reason: HOSPADM

## 2023-01-09 RX ORDER — ACETAMINOPHEN 325 MG/1
650 TABLET ORAL EVERY 4 HOURS PRN
Status: DISCONTINUED | OUTPATIENT
Start: 2023-01-09 | End: 2023-01-09 | Stop reason: HOSPADM

## 2023-01-09 RX ORDER — SODIUM CHLORIDE 9 MG/ML
25 INJECTION, SOLUTION INTRAVENOUS PRN
Status: CANCELLED | OUTPATIENT
Start: 2023-01-09

## 2023-01-09 RX ORDER — ONDANSETRON 2 MG/ML
4 INJECTION INTRAMUSCULAR; INTRAVENOUS EVERY 6 HOURS PRN
Status: CANCELLED | OUTPATIENT
Start: 2023-01-09

## 2023-01-09 RX ADMIN — TERBUTALINE SULFATE 0.25 MG: 1 INJECTION, SOLUTION SUBCUTANEOUS at 11:14

## 2023-01-09 RX ADMIN — Medication: at 20:25

## 2023-01-09 RX ADMIN — IBUPROFEN 600 MG: 600 TABLET, FILM COATED ORAL at 20:23

## 2023-01-09 RX ADMIN — Medication: at 15:28

## 2023-01-09 RX ADMIN — Medication 7 ML: at 10:24

## 2023-01-09 RX ADMIN — Medication 166.7 ML: at 15:39

## 2023-01-09 RX ADMIN — Medication 87.3 MILLI-UNITS/MIN: at 15:51

## 2023-01-09 RX ADMIN — LIDOCAINE HYDROCHLORIDE 100 MG: 10 INJECTION, SOLUTION INFILTRATION; PERINEURAL at 15:40

## 2023-01-09 RX ADMIN — Medication 15 ML/HR: at 10:25

## 2023-01-09 ASSESSMENT — PAIN DESCRIPTION - DESCRIPTORS: DESCRIPTORS: CRAMPING;DISCOMFORT

## 2023-01-09 ASSESSMENT — PAIN DESCRIPTION - ORIENTATION: ORIENTATION: LOWER

## 2023-01-09 ASSESSMENT — LIFESTYLE VARIABLES: SMOKING_STATUS: 0

## 2023-01-09 ASSESSMENT — PAIN - FUNCTIONAL ASSESSMENT: PAIN_FUNCTIONAL_ASSESSMENT: ACTIVITIES ARE NOT PREVENTED

## 2023-01-09 ASSESSMENT — PAIN SCALES - GENERAL: PAINLEVEL_OUTOF10: 4

## 2023-01-09 ASSESSMENT — PAIN DESCRIPTION - LOCATION: LOCATION: ABDOMEN

## 2023-01-09 NOTE — PROGRESS NOTES
Report given by [facility] RN. Assumed pt care at this time. Pt transferred to LD 7. Consents signed using  #[id number].

## 2023-01-09 NOTE — PROGRESS NOTES
Amnisure pending Recognizes 2 fingers or can read (II)/Opens mouth, sticks out tongue (V, XII)/Normal speech (IX, X, XII)/EOMI (III, IV, VI)/Shoulder shrug (XI)/Hearing intact (VIII)

## 2023-01-09 NOTE — PROGRESS NOTES
Notified Dr. Dilcia Ochoa patient is complete/0 station. Reviewed fetal tracing with occasional late and variable decelerations and ctx pattern. Orders to have patient labor down.

## 2023-01-09 NOTE — H&P
Obstetric History and Physical       CHIEF COMPLAINT:  leakage of amniotic fluid    HISTORY OF PRESENT ILLNESS:      Emmett Mcgowan is a 23 y.o., , female who presents at 38w1d with an [facility] of Estimated Date of Delivery: 23. OB History          1    Para        Term                AB        Living             SAB        IAB        Ectopic        Molar        Multiple        Live Births                Patient presents with a chief complaint as above and is being admitted for leaking of fluid beginning at 6 am this morning. Contractions began yesterday and occur every 2-3 min. Reports decreased fetal movement. Denies vaginal bleeding or discharge. PRENATAL CARE: Clinic    Complicated by:   Circumvallate placenta, resolved  Low lying placenta, resolved  Subchorionic hematoma, resolved  Arabic speaking,  [id number]     PAST OB HISTORY  OB History          1    Para        Term                AB        Living             SAB        IAB        Ectopic        Molar        Multiple        Live Births                    Past Medical History:    None    Past Surgical History:    History reviewed. No pertinent surgical history. Allergies:  Patient has no known allergies.   Social History:    Social History     Socioeconomic History    Marital status: Single     Spouse name: Not on file    Number of children: Not on file    Years of education: Not on file    Highest education level: Not on file   Occupational History    Not on file   Tobacco Use    Smoking status: Never    Smokeless tobacco: Never   Vaping Use    Vaping Use: Never used   Substance and Sexual Activity    Alcohol use: Never    Drug use: Never    Sexual activity: Yes     Partners: Male   Other Topics Concern    Not on file   Social History Narrative    Not on file     Social Determinants of Health     Financial Resource Strain: Not on file   Food Insecurity: Not on file   Transportation Needs: Not on file Physical Activity: Not on file   Stress: Not on file   Social Connections: Not on file   Intimate Partner Violence: Not on file   Housing Stability: Not on file     Family History:       Problem Relation Age of Onset    No Known Problems Father     No Known Problems Mother      Medications Prior to Admission:  Medications Prior to Admission: Prenatal Vit-Fe Fumarate-FA (PRENATAL VITAMINS) 28-0.8 MG TABS, Take 1 tablet by mouth daily    REVIEW OF SYSTEMS:    CONSTITUTIONAL:  negative  RESPIRATORY:  negative  CARDIOVASCULAR:  negative  GASTROINTESTINAL:  negative  ALLERGIC/IMMUNOLOGIC:  negative  NEUROLOGICAL:  negative  BEHAVIOR/PSYCH:  negative    PHYSICAL EXAM:  Vitals:    01/09/23 0832   Temp: 97.6 °F (36.4 °C)   TempSrc: Oral   Weight: 118 lb (53.5 kg)   Height: 5' (1.524 m)     General appearance:  awake, alert, cooperative, no apparent distress, and appears stated age  Neurologic:  Awake, alert, oriented to name, place and time. Lungs:  No increased work of breathing, good air exchange  Abdomen:  Soft, non tender, gravid,  Fetal heart rate:  Reassuring.   Pelvis:  Adequate pelvis  Cervix: 2 cm 70 soft -2  Contraction frequency:  2-3 minutes    Membranes:  Ruptured clear fluid (Amnisure positive)    ASSESSMENT AND PLAN:    Labor: Admit, anticipate normal delivery, routine labor orders  Fetus: Reassuring  GBS: Negative     Discussed with Dr. Xiomara Woods

## 2023-01-09 NOTE — ANESTHESIA PRE PROCEDURE
Department of Anesthesiology  Preprocedure Note       Name:  Schuyler Self   Age:  23 y.o.  :  2000                                          MRN:  [id number]         Date:  2023      Surgeon: * No surgeons listed *    Procedure: * No procedures listed *    Medications prior to admission:   Prior to Admission medications    Medication Sig Start Date End Date Taking?  Authorizing Provider   Prenatal Vit-Fe Fumarate-FA (PRENATAL VITAMINS) 28-0.8 MG TABS Take 1 tablet by mouth daily 22   REHANA Field CNM       Current medications:    Current Facility-Administered Medications   Medication Dose Route Frequency Provider Last Rate Last Admin    povidone-iodine (BETADINE) 10 % external solution             lidocaine 1 % injection             lactated ringers infusion   IntraVENous Continuous Geraldine Lund MD        lactated ringers bolus  500 mL IntraVENous PRN Geraldine Lund MD        Or    lactated ringers bolus  1,000 mL IntraVENous PRN Geraldine Lund MD        sodium chloride flush 0.9 % injection 5-40 mL  5-40 mL IntraVENous 2 times per day Preeti Pace MD        sodium chloride flush 0.9 % injection 5-40 mL  5-40 mL IntraVENous PRN Geraldine Lund MD        0.9 % sodium chloride infusion  25 mL IntraVENous PRN Geraldine Lund MD        ondansetron (ZOFRAN) injection 4 mg  4 mg IntraVENous Q6H PRN Geraldine Lund MD        oxytocin (PITOCIN) 15 units in 250 mL infusion  87.3 kenny-units/min IntraVENous Continuous PRN Geraldine Lund MD        And    oxytocin (PITOCIN) 10 unit bolus from the bag  10 Units IntraVENous PRN Preeti Pace MD        methylergonovine (METHERGINE) injection 200 mcg  200 mcg IntraMUSCular PRN Preeti Pace MD        carboprost (HEMABATE) injection 250 mcg  250 mcg IntraMUSCular PRN Geraldine Lund MD        miSOPROStol (CYTOTEC) tablet 800 mcg  800 mcg Rectal PRN Geraldine Lund MD        tranexamic acid (CYKLOKAPRON) 1,000 mg in sodium chloride 0.9 % 100 mL IVPB  1,000 mg IntraVENous Once PRN Osiel Ornelas MD        acetaminophen (TYLENOL) tablet 650 mg  650 mg Oral Q4H PRN Geraldine Lund MD        benzocaine-menthol (DERMOPLAST) 20-0.5 % spray   Topical PRN Geraldine Lund MD        docusate sodium (COLACE) capsule 100 mg  100 mg Oral BID Osiel Ornelas MD        naloxone 0.4 mg in 10 mL sodium chloride syringe   IntraVENous PRN Jeremy Retana MD        ondansetron [facility]) injection 4 mg  4 mg IntraVENous Q6H PRN Jeremy Retana MD        fentaNYL 1.85mcg/ml and Bupivicaine 0.1% in 0.9% NS 135ml infusion (OB) epidural  15 mL/hr Epidural Continuous Jeremy Retana MD           Allergies:  No Known Allergies    Problem List:    Patient Active Problem List   Diagnosis Code    High risk teen pregnancy in second trimester O09.892    Supervision of normal first pregnancy Z34.00    Circumvallate placenta O43.119    Low-lying placenta O44.40    Non-English speaking patient Z78.9    Subchorionic hematoma O41.8X90, O46.8X9     uterine contractions in third trimester, antepartum O47.03    Urinary frequency R35.0    Amniotic fluid leaking O42.90       Past Medical History:        Diagnosis Date    34 weeks gestation of pregnancy 12/15/2022       Past Surgical History:  History reviewed. No pertinent surgical history.     Social History:    Social History     Tobacco Use    Smoking status: Never    Smokeless tobacco: Never   Substance Use Topics    Alcohol use: Never                                Counseling given: Not Answered      Vital Signs (Current):   Vitals:    23 0829 23 0832   BP: 124/69    Pulse: 85    Resp: 16    Temp: 36.4 °C (97.6 °F) 36.4 °C (97.6 °F)   TempSrc: Oral Oral   Weight:  118 lb (53.5 kg)   Height:  5' (1.524 m)                                              BP Readings from Last 3 Encounters:   23 124/69   23 114/70   22 107/61       NPO Status: BMI:   Wt Readings from Last 3 Encounters:   01/09/23 118 lb (53.5 kg)   01/05/23 118 lb (53.5 kg)   12/22/22 115 lb (52.2 kg)     Body mass index is 23.05 kg/m². CBC:   Lab Results   Component Value Date/Time    WBC 11.0 01/09/2023 09:40 AM    RBC 4.46 01/09/2023 09:40 AM    HGB 9.4 01/09/2023 09:40 AM    HCT 31.5 01/09/2023 09:40 AM    MCV 70.6 01/09/2023 09:40 AM    RDW 15.9 01/09/2023 09:40 AM     01/09/2023 09:40 AM       CMP:   Lab Results   Component Value Date/Time     07/07/2022 10:35 AM    K 4.3 07/07/2022 10:35 AM     07/07/2022 10:35 AM    CO2 19 07/07/2022 10:35 AM    BUN 5 07/07/2022 10:35 AM    CREATININE 0.4 07/07/2022 10:35 AM    GFRAA >60 07/07/2022 10:35 AM    LABGLOM >60 07/07/2022 10:35 AM    GLUCOSE 74 10/31/2022 12:37 PM    GLUCOSE 75 07/07/2022 10:35 AM    PROT 7.5 07/07/2022 10:35 AM    CALCIUM 9.1 07/07/2022 10:35 AM    BILITOT 0.3 07/07/2022 10:35 AM    ALKPHOS 54 07/07/2022 10:35 AM    AST 21 07/07/2022 10:35 AM    ALT 11 07/07/2022 10:35 AM       POC Tests: No results for input(s): POCGLU, POCNA, POCK, POCCL, POCBUN, POCHEMO, POCHCT in the last 72 hours.     Coags: No results found for: PROTIME, INR, APTT    HCG (If Applicable):   Lab Results   Component Value Date    PREGTESTUR positive 06/27/2022        ABGs: No results found for: PHART, PO2ART, JIT8LUB, SDB6IZU, BEART, G5WXRRNN     Type & Screen (If Applicable):  No results found for: LABABO, LABRH    Drug/Infectious Status (If Applicable):  No results found for: HIV, HEPCAB    COVID-19 Screening (If Applicable):   Lab Results   Component Value Date/Time    COVID19 Not-Detected 11/09/2022 04:20 PM    COVID19 Not Detected 08/24/2021 10:25 AM           Anesthesia Evaluation  Patient summary reviewed and Nursing notes reviewed no history of anesthetic complications:   Airway: Mallampati: II  TM distance: >3 FB   Neck ROM: full  Mouth opening: > = 3 FB   Dental: normal exam Pulmonary:Negative Pulmonary ROS and normal exam  breath sounds clear to auscultation      (-) not a current smoker                           Cardiovascular:Negative CV ROS            Rhythm: regular  Rate: normal                    Neuro/Psych:   Negative Neuro/Psych ROS              GI/Hepatic/Renal: Neg GI/Hepatic/Renal ROS            Endo/Other: Negative Endo/Other ROS             Pt had no PAT visit       Abdominal:             Vascular: negative vascular ROS. Other Findings:           Anesthesia Plan      general, spinal and epidural     ASA 2     (Discussed risks and benefits of epidural anesthesia, discussed spinal/general anesthesia if needed.)        Anesthetic plan and risks discussed with patient. Use of blood products discussed with patient whom consented to blood products. Plan discussed with attending.                     Mary Mir, REHANA - CRNA   1/9/2023

## 2023-01-09 NOTE — L&D DELIVERY NOTE
Delivery Note    Normal spontaneous vaginal delivery of a viable male infant in [place]. Labor course c/b recurrent variables toward end of labor course with intermittent recovery with intrauterine resuscitative measures. Apgars 6/9. BW 3120g. Body cord was present and reduced after delivery. Normal placenta was delivered spontaneously and was noted to be intact and that the umbilical cord had three vessels noted. Pitocin started after delivery of placenta. Right mediolateral episiotomy cut to allow for room of delivery of head. Suture used for repair:  Chromic 2.0. Second degree laceration. Suture used for repair:  Vicryl 3.0 repaired under 2% lidocaine .  ml. infant stable and mother stable. Cord blood and gases sent.  Routine postpartum care    Dr. Aurora Burleson present for and supervised entire delivery

## 2023-01-09 NOTE — PROGRESS NOTES
Patient actively pushing. RN remains in continuous attendance at the bedside. Assessment & evaluation of fetal heart rate, ongoing via continuous EFM. Dr. Sheri Mitchell at bedside.

## 2023-01-09 NOTE — PROGRESS NOTES
9 minute prolonged deceleration with dalton to to 70 bpm. Resuscitative measures performed with return to baseline of 135 pm with moderate variability. SVE 5/90/-1. IUPC and FSE placed. Contraction pattern was q 1-2 minutes, likely etiology of fetal distress. Will give terbutaline and continue to monitor closely.

## 2023-01-09 NOTE — PROGRESS NOTES
, 38.1 presents to unit c/o contractions and lof starting last night and has gotten worse. Lof states started this morning. Pt states +FM. VSS. Call light in reach.  Questions answered via  # [phone]

## 2023-01-09 NOTE — PROGRESS NOTES
Patient actively pushing. RN remains in continuous attendance at the bedside. Assessment & evaluation of fetal heart rate, ongoing via continuous EFM. Dr. Nagi Cifuentes at bedside.

## 2023-01-09 NOTE — ANESTHESIA PROCEDURE NOTES
Epidural Block    Patient location during procedure: OB  Reason for block: labor epidural  Staffing  Performed: resident/CRNA   Resident/CRNA: REHANA Daley - CRNA  Epidural  Patient position: sitting  Prep: ChloraPrep  Patient monitoring: continuous pulse ox and frequent blood pressure checks  Approach: midline  Location: L3-4  Injection technique: TANYA air  Provider prep: mask and sterile gloves  Needle  Needle type: Tuohy   Needle gauge: 17 G  Needle length: 3.5 in  Needle insertion depth: 4.5 cm  Catheter type: end hole  Catheter size: 20 G (20 G)  Catheter at skin depth: 12 cm  Test dose: negativeCatheter Secured: tegaderm and tape  Assessment  Hemodynamics: stable  Attempts: 1  Outcomes: uncomplicated and patient tolerated procedure well  Preanesthetic Checklist  Completed: patient identified, IV checked, site marked, risks and benefits discussed, surgical/procedural consents, equipment checked, pre-op evaluation, timeout performed, anesthesia consent given, oxygen available and monitors applied/VS acknowledged

## 2023-01-10 LAB
HCT VFR BLD CALC: 30.3 % (ref 34–48)
HEMOGLOBIN: 8.8 G/DL (ref 11.5–15.5)
MCH RBC QN AUTO: 20.6 PG (ref 26–35)
MCHC RBC AUTO-ENTMCNC: 29 % (ref 32–34.5)
MCV RBC AUTO: 71 FL (ref 80–99.9)
PDW BLD-RTO: 16.1 FL (ref 11.5–15)
PLATELET # BLD: 317 E9/L (ref 130–450)
PMV BLD AUTO: 10.2 FL (ref 7–12)
RBC # BLD: 4.27 E12/L (ref 3.5–5.5)
WBC # BLD: 14.4 E9/L (ref 4.5–11.5)

## 2023-01-10 PROCEDURE — 1220000000 HC SEMI PRIVATE OB R&B

## 2023-01-10 PROCEDURE — 99024 POSTOP FOLLOW-UP VISIT: CPT | Performed by: ADVANCED PRACTICE MIDWIFE

## 2023-01-10 PROCEDURE — 6370000000 HC RX 637 (ALT 250 FOR IP): Performed by: FAMILY MEDICINE

## 2023-01-10 PROCEDURE — 85027 COMPLETE CBC AUTOMATED: CPT

## 2023-01-10 PROCEDURE — 36415 COLL VENOUS BLD VENIPUNCTURE: CPT

## 2023-01-10 PROCEDURE — 2580000003 HC RX 258: Performed by: FAMILY MEDICINE

## 2023-01-10 RX ADMIN — SODIUM CHLORIDE, PRESERVATIVE FREE 10 ML: 5 INJECTION INTRAVENOUS at 10:00

## 2023-01-10 RX ADMIN — IBUPROFEN 600 MG: 600 TABLET, FILM COATED ORAL at 05:05

## 2023-01-10 RX ADMIN — IBUPROFEN 600 MG: 600 TABLET, FILM COATED ORAL at 16:15

## 2023-01-10 RX ADMIN — IBUPROFEN 600 MG: 600 TABLET, FILM COATED ORAL at 21:58

## 2023-01-10 RX ADMIN — ACETAMINOPHEN 1000 MG: 500 TABLET ORAL at 20:39

## 2023-01-10 ASSESSMENT — PAIN SCALES - GENERAL
PAINLEVEL_OUTOF10: 5
PAINLEVEL_OUTOF10: 2

## 2023-01-10 ASSESSMENT — PAIN DESCRIPTION - LOCATION
LOCATION: HEAD
LOCATION: ABDOMEN
LOCATION: ABDOMEN
LOCATION: PERINEUM

## 2023-01-10 ASSESSMENT — PAIN DESCRIPTION - DESCRIPTORS
DESCRIPTORS: DISCOMFORT
DESCRIPTORS: ACHING;DISCOMFORT;SORE

## 2023-01-10 ASSESSMENT — PAIN - FUNCTIONAL ASSESSMENT
PAIN_FUNCTIONAL_ASSESSMENT: ACTIVITIES ARE NOT PREVENTED

## 2023-01-10 ASSESSMENT — PAIN DESCRIPTION - ORIENTATION
ORIENTATION: LOWER

## 2023-01-10 ASSESSMENT — PAIN DESCRIPTION - RADICULAR PAIN: RADICULAR_PAIN: ABSENT

## 2023-01-10 NOTE — LACTATION NOTE
First time mom. Using , instructed on normal infant behavior, benefits of colostrum/breast milk for baby and mom,  benefits of skin to skin and components of safe positioning. Encouraged rooming-in and avoidance of pacifier use until breastfeeding is well established. Reviewed latch techniques, positioning, signs of effective milk transfer, waking techniques and the importance of frequent feedings- 8-12 times/ 24 hrs to stimulate/maintain milk production. Taught hand expression and encouraged to express drops of colostrum at start of feeding. Reviewed hunger cues and expected urine/stool output and transition. Encouraged to feed infant as often and for as long as the infant wishes to do so. Went over breastfeeding resources and the breastfeeding guide. Gave mom a hand held breast pump and explained use and care. Assisted with baby's first breastfeed. Baby latched with encouragement and suckled briefly. Offered support and encouraged to call for assistance or concerns.

## 2023-01-10 NOTE — PROGRESS NOTES
PPD #1    Patient:  Maulik Delay Date:  1/9/2023  8:17 AM  Medical Record Number:  [id number]   Today's Date: 1/10/2023    S: No complaints; tolerating diet: yes - ; ambulating well: yes - ; voiding without difficulty:  yes - ; bm: no; flatus: yes - ; pain meds appropriate: yes - ; vaginal bleeding: thin lochia.     O:   Vitals:    01/09/23 1739 01/09/23 1835 01/09/23 2258 01/10/23 0706   BP: 112/67 128/69 (!) 103/55 125/69   Pulse: 79 80 72 74   Resp:  16 16 16   Temp:  98.9 °F (37.2 °C) 97.7 °F (36.5 °C) 98.1 °F (36.7 °C)   TempSrc:  Oral Oral Oral   SpO2:  98% 98% 100%   Weight:       Height:         Gen: A&O, cooperative, pleasant   Heart: RRR   Lungs: CTA b/l   Abd; soft, NT, non distended, +BS  Back: no CVA or paraspinal tenderness   Ext: No clubbing, cyanosis, edema:no , no cords palpable, no calf tenderness   Neuro: intact   Uterus: firm, well contracted, nt   Perineum: intact, healing well   Maile pad: thin lochia    Lab Results   Component Value Date    HGB 8.8 (L) 01/10/2023    HCT 30.3 (L) 01/10/2023      Recent Results (from the past 72 hour(s))   POC AmniSure    Collection Time: 01/09/23  8:45 AM   Result Value Ref Range    Amnisure, POC Positive Negative    Lot Number [id number]     Positive QC Pass/Fail Pass     Negative QC Pass/Fail Pass    CBC    Collection Time: 01/09/23  9:40 AM   Result Value Ref Range    WBC 11.0 4.5 - 11.5 E9/L    RBC 4.46 3.50 - 5.50 E12/L    Hemoglobin 9.4 (L) 11.5 - 15.5 g/dL    Hematocrit 31.5 (L) 34.0 - 48.0 %    MCV 70.6 (L) 80.0 - 99.9 fL    MCH 21.1 (L) 26.0 - 35.0 pg    MCHC 29.8 (L) 32.0 - 34.5 %    RDW 15.9 (H) 11.5 - 15.0 fL    Platelets 823 531 - 381 E9/L    MPV 9.8 7.0 - 12.0 fL   Drug Screen Multi Urine With Bup    Collection Time: 01/09/23  9:40 AM   Result Value Ref Range    Amphetamine Screen, Urine NOT DETECTED Negative <1000 ng/mL    Barbiturate Screen, Ur NOT DETECTED Negative < 200 ng/mL    Benzodiazepine Screen, Urine NOT DETECTED Negative < 200 ng/mL    Cannabinoid Scrn, Ur NOT DETECTED Negative < 50ng/mL    Cocaine Metabolite Screen, Urine NOT DETECTED Negative < 300 ng/mL    Opiate Scrn, Ur NOT DETECTED Negative < 300ng/mL    PCP Screen, Urine NOT DETECTED Negative < 25 ng/mL    Methadone Screen, Urine NOT DETECTED Negative <300 ng/mL    Oxycodone Urine NOT DETECTED Negative <100 ng/mL    Buprenorphine Urine NOT DETECTED Negative <5 ng/mL    Drug Screen Comment: see below     FENTANYL SCREEN, URINE NOT DETECTED Negative <1 ng/mL   TYPE AND SCREEN    Collection Time: 23  9:40 AM   Result Value Ref Range    ABO/Rh B POS     Antibody Screen NEG    CBC    Collection Time: 01/10/23  5:02 AM   Result Value Ref Range    WBC 14.4 (H) 4.5 - 11.5 E9/L    RBC 4.27 3.50 - 5.50 E12/L    Hemoglobin 8.8 (L) 11.5 - 15.5 g/dL    Hematocrit 30.3 (L) 34.0 - 48.0 %    MCV 71.0 (L) 80.0 - 99.9 fL    MCH 20.6 (L) 26.0 - 35.0 pg    MCHC 29.0 (L) 32.0 - 34.5 %    RDW 16.1 (H) 11.5 - 15.0 fL    Platelets 797 946 - 344 E9/L    MPV 10.2 7.0 - 12.0 fL       A: 23 y.o. female  at 38w1d weeks  PPD #1 S/P ; Episiotomy was performed in the midline with 2nd degree laceration  Patient Active Problem List   Diagnosis    High risk teen pregnancy in second trimester    Supervision of normal first pregnancy    Circumvallate placenta    Low-lying placenta    Non-English speaking patient    Subchorionic hematoma     uterine contractions in third trimester, antepartum    Urinary frequency    Amniotic fluid leaking    38 weeks gestation of pregnancy     (normal spontaneous vaginal delivery)       P: Routine PP care.    Home tomorrow   H and H in am    REHANA England CNM    1/10/2023 8:26 AM

## 2023-01-10 NOTE — PROGRESS NOTES
Patient oriented to room 308 with , call light and telephone usage shown to the patient. New admission vital signs and assessment completed as charted. New admission paperwork gone over with the patient including the TDAP, Flu, and Hepatitis B vaccines. The patient is refusing the TDAP and Flu vaccines, and is unsure at this time if she would like the baby to receive the Hepatitis B vaccine. Instructed the patient to call for first time out of bed. All questions answered.

## 2023-01-10 NOTE — PLAN OF CARE
Problem: Pain  Goal: Verbalizes/displays adequate comfort level or baseline comfort level  Outcome: Progressing     Problem: Postpartum  Goal: Experiences normal postpartum course  Description:  Postpartum OB-Pregnancy care plan goal which identifies if the mother is experiencing a normal postpartum course  Outcome: Progressing  Goal: Appropriate maternal -  bonding  Description:  Postpartum OB-Pregnancy care plan goal which identifies if the mother and  are bonding appropriately  Outcome: Progressing  Goal: Establishment of infant feeding pattern  Description:  Postpartum OB-Pregnancy care plan goal which identifies if the mother is establishing a feeding pattern with their   Outcome: Progressing

## 2023-01-10 NOTE — PROGRESS NOTES
[place] screening results were discussed with parent. Questions answered. Brochure given to parent. Advised to monitor developmental milestones and contact physician for any concerns.    Deandre Hale

## 2023-01-11 VITALS
HEIGHT: 60 IN | WEIGHT: 118 LBS | TEMPERATURE: 98.6 F | RESPIRATION RATE: 18 BRPM | BODY MASS INDEX: 23.16 KG/M2 | OXYGEN SATURATION: 98 % | DIASTOLIC BLOOD PRESSURE: 59 MMHG | SYSTOLIC BLOOD PRESSURE: 102 MMHG | HEART RATE: 68 BPM

## 2023-01-11 LAB
HCT VFR BLD CALC: 27.4 % (ref 34–48)
HEMOGLOBIN: 8.2 G/DL (ref 11.5–15.5)

## 2023-01-11 PROCEDURE — 85018 HEMOGLOBIN: CPT

## 2023-01-11 PROCEDURE — 85014 HEMATOCRIT: CPT

## 2023-01-11 PROCEDURE — 6370000000 HC RX 637 (ALT 250 FOR IP): Performed by: FAMILY MEDICINE

## 2023-01-11 PROCEDURE — 99238 HOSP IP/OBS DSCHRG MGMT 30/<: CPT | Performed by: PHYSICIAN ASSISTANT

## 2023-01-11 PROCEDURE — 36415 COLL VENOUS BLD VENIPUNCTURE: CPT

## 2023-01-11 PROCEDURE — 6370000000 HC RX 637 (ALT 250 FOR IP): Performed by: PHYSICIAN ASSISTANT

## 2023-01-11 RX ORDER — FERROUS SULFATE 325(65) MG
325 TABLET ORAL 2 TIMES DAILY WITH MEALS
Qty: 60 TABLET | Refills: 1 | Status: SHIPPED | OUTPATIENT
Start: 2023-01-11

## 2023-01-11 RX ORDER — IBUPROFEN 600 MG/1
600 TABLET ORAL EVERY 6 HOURS PRN
Qty: 30 TABLET | Refills: 0 | Status: SHIPPED | OUTPATIENT
Start: 2023-01-11

## 2023-01-11 RX ORDER — FERROUS SULFATE 325(65) MG
325 TABLET ORAL 2 TIMES DAILY WITH MEALS
Status: DISCONTINUED | OUTPATIENT
Start: 2023-01-11 | End: 2023-01-11 | Stop reason: HOSPADM

## 2023-01-11 RX ADMIN — FERROUS SULFATE TAB 325 MG (65 MG ELEMENTAL FE) 325 MG: 325 (65 FE) TAB at 09:11

## 2023-01-11 RX ADMIN — ACETAMINOPHEN 1000 MG: 500 TABLET ORAL at 09:10

## 2023-01-11 RX ADMIN — IBUPROFEN 600 MG: 600 TABLET, FILM COATED ORAL at 06:23

## 2023-01-11 ASSESSMENT — PAIN DESCRIPTION - DESCRIPTORS
DESCRIPTORS: SORE;TENDER
DESCRIPTORS: DISCOMFORT

## 2023-01-11 ASSESSMENT — PAIN - FUNCTIONAL ASSESSMENT
PAIN_FUNCTIONAL_ASSESSMENT: ACTIVITIES ARE NOT PREVENTED
PAIN_FUNCTIONAL_ASSESSMENT: ACTIVITIES ARE NOT PREVENTED

## 2023-01-11 ASSESSMENT — PAIN SCALES - GENERAL
PAINLEVEL_OUTOF10: 2
PAINLEVEL_OUTOF10: 5

## 2023-01-11 ASSESSMENT — PAIN DESCRIPTION - LOCATION
LOCATION: VAGINA
LOCATION: ABDOMEN

## 2023-01-11 ASSESSMENT — PAIN DESCRIPTION - ORIENTATION
ORIENTATION: LOWER
ORIENTATION: LOWER

## 2023-01-11 NOTE — DISCHARGE INSTRUCTIONS
Tub bath in 6 weeks  You may take a shower  Driving 2-3 weeks  Sexual activity 6 weeks  Work/School  6 weeks  Walking  As tolerated  Stairs as tolerated  Lifting no heavy lifting Follow-up with your OB doctor in 1 week if  delivery or in  6 weeks for vaginal delivery unless otherwise instructed. Call office for an appointment. For breastfeeding support, you can contact our lactation specialists at [phone], [phone], or [phone]. DIET  Eat a well balanced diet focusing on foods high in fiber and protein  Drink plenty of fluids especially water. To avoid constipation you may take a mild stool softener as recommended by your doctor or midwife. ACTIVITY  Gradually increase your activity. Resume exercise regimen only after advised by your doctor or midwife. Avoid lifting anything heavier than your baby or a gallon of milk for SIX weeks. Avoid driving until your doctor or midwife has given their approval.  Mishel Aguilar slowly from a lying to sitting and then a standing position. Climb stairs one at a time. Use caution when carrying your baby up and down the stairs. No sexual activity for 6 weeks or until advised by your doctor - Nothing in vagina: intercourse, tampons, or douching. Be prepared to discuss family planning at your follow-up OB visit. You may feel tired or have a lack of energy. You may continue your prenatal vitamin to replenish nutrients post delivery. Nap when baby naps to catch up on sleep. May return to work or school in 6 weeks or as directed by OB. EMOTIONS  You may feed garner, sad, teary, & overwhelmed. Contact your OB provider if you feel you may be showing signs of postpartum depression, or have thoughts of harming yourself or your infant. If infant will not stop crying, contact another adult for help or place infant in their crib on their back and take a break. NEVER shake your infant.       BLEEDING  Vaginal bleeding will decrease in amount over the next few weeks. You will notice that as your activity increases, your flow may increase. This is your body's way of telling you, you need to take things easier and rest more often. Call your OB/ER if you are saturating more than one maxi pad in an hour. BREAST CARE  Take medications as recommended by your doctor or midwife for pain  If you develop a warm, red, tender area on your breast or develop a fever contact your OB provider. For breastfeeding moms:  If you become engorged, feeding may be more difficult or painful for 1-2 days. You may find it helpful to hand express some milk so that the infant can latch on more easily. While breastfeeding, continue to take your prenatal vitamins as directed by your doctor or midwife. Only take medications verified as safe for breastfeeding. For non-breastfeeding moms:  You may apply ice packs to your breasts over your bra for twenty minutes at a time for comfort. Avoid stimulation to your breasts, when showering allow the water to strike your back not your breasts. Wear a good fitting bra until your milk dries, such as a sports bra. INCISIONAL CARE / KEN CARE  Clean your incision in the shower with mild soap. After shower pat the incision area dry and leave open to air. If used, Steri-stipes should be removed by 2 weeks. If used, Talisha Fling should be removed by the OB in office by 1 week. If used/ordered, an abdominal binder may provide support for your incision. Use the ken-bottle after toileting until bleeding stops. Cleanse your perineum from front to back  If used, stitches or internal clips will dissolve in 4-6 weeks. You may use a sitz bath or soak in a clean tub as needed for comfort. Kegel exercises will help restore bladder control. SWELLING  Keep your legs elevated when sitting or lying. When wearing stocking or socks, make sure they are not too tight. WHEN TO CALL THE DOCTOR  If you have a temp of 100.4 or more.    If your bleeding has increased and you are saturating a pad in an hour. Your abdomen is tender to touch. You are passing blood clots bigger than the size of a lemon. If you are experiencing extreme weakness or dizziness. If you are having flu-like symptoms such as achy muscles or joints. There is a foul smell or a green color to your vaginal bleeding. If you have pain that cannot be relieved. You have persistent burning with urination or frequency. Call if you have concerns about your well-being. You are unable to sleep, eat, or are having thoughts of harming yourself or your baby. You have swelling, bleeding, drainage, foul odor, redness, or warmth in/around your incision or stitches. You have a red, warm, tender area in you calf.

## 2023-01-11 NOTE — DISCHARGE SUMMARY
Obstetrical Discharge Form    Patient Name: Emmanuel Walter    YOB: 2000    Medical Record Number: [id number]    Primary OB Clinician: Arturo Perdomo DO; Vijaya Lake CNM    Reason for Hospitalization, Chief Complaint, Diagnosis, etc: 23year old female  1 para 0 at 37 weeks' 1 day' gestation presented 2023 with c/o leakage of fluid. Amnisure was performed, result was positive for spontaneous rupture of amniotic membranes. She was admitted and progressed to complete, delivering at 15:33. Gestational Age at time of delivery:  38 weeks 1 day    Date of Admission/Delivery: 2023    Delivery Type: spontaneous vaginal delivery    Baby: Liveborn male, weighing 3120 grams with Apgar scores of 6 & 9 at 15:33    Intrapartum complications: Previous subchorionic hematoma, resolved; previous circumvallate placenta, resolved; previous low-lying placenta- resolved. Acute on chronic blood loss anemia. Laceration: 2nd degree perineal     Episiotomy: right mediolateral    Feeding method: formula- Similac Advance    Complications: none    Discharge Date: 2023 postpartum day # 2, postpartum course unremarkable. Condition: both Mother and infant discharged home in stable condition  Plan:     Follow-up appointment win the [facility] in 6 weeks, sooner if needed. Advised to call and schedule her appointment. Precautions given: call for: fever greater than 101. 4 degrees, pain, heavy bleeding, vomiting, shortness of breath, breast redness or pain, calf pain, or any concerns. No heavy lifting. No sexual intercourse for 6 weeks. Please refer to chart for further details. Scripts: Motrin, FeSO4. Resume PNV with iron.         Luis Moss PA-C

## 2023-01-11 NOTE — PROGRESS NOTES
Mother and baby discharged via [facility] in stable condition to private car in main lot accompanied by PcA and Father of baby.

## 2023-01-11 NOTE — PLAN OF CARE
Problem: Pain  Goal: Verbalizes/displays adequate comfort level or baseline comfort level  1/10/2023 2356 by Katherine Wolf RN  Outcome: Progressing     Problem: Postpartum  Goal: Experiences normal postpartum course  Description:  Postpartum OB-Pregnancy care plan goal which identifies if the mother is experiencing a normal postpartum course  1/10/2023 2356 by Katherine Wolf RN  Outcome: Progressing     Problem: Infection - Adult  Goal: Absence of infection at discharge  Outcome: Progressing

## 2023-01-11 NOTE — PROGRESS NOTES
SUBJECTIVE:  Patient without complaint. Voiding without difficulty. Passing flatus. Tolerating regular diet. Normal lochia, denies passing clots. Ambulating in hallway. Denies emotional concerns. Formula feeding infant. OBJECTIVE:  BP (!) 102/59   Pulse 68   Temp 98.6 °F (37 °C) (Oral)   Resp 16   Ht 5' (1.524 m)   Wt 118 lb (53.5 kg)   LMP 2022 (Exact Date)   SpO2 98%  BMI 23.05 kg/m²   Recent Labs     23  0603 01/10/23  0502 23  0940   WBC  --  14.4* 11.0   HGB 8.2* 8.8* 9.4*   HCT 27.4* 30.3* 31.5*   MCV  --  71.0* 70.6*   PLT  --  317 346        Physical Exam:  General: Comfortable  Coronary: RRR  Pulmonary: CTA b/l  Abdomen: soft, nontender. Uterus firm 1 cm below umbilicus, nontender  Perineum: intact, healing well   Peripad- Lochia thin rubra  Extremities: (-) edema, (-) calf tenderness.         ASSESSMENT/PLAN: 22 yo female  38w1d s/p  2023 at 15:33  Postpartum Day #2  Acute on chronic blood loss anemia  Advance care  Discharge  Follow-up in [facility] in 6 weeks, sooner if needed  Precautions given  Script for Motrin, FeSO4       Chula Richey, GUS 2023 8:25 AM

## 2023-01-11 NOTE — PROGRESS NOTES
Attempted to do discharge teaching with patient. FOB stated that the MOB was tired and requested that the teaching be done tomorrow.

## 2023-01-11 NOTE — ADDENDUM NOTE
Addendum  created 01/11/23 0846 by REHANA Bernal NP    Intraprocedure Staff edited, Procedure Navigator section edited

## 2023-01-11 NOTE — PROGRESS NOTES
Maternal/ discharge teaching and instructions reviewed with  and pt's  in room. Verbalized understanding. Copy of instructions in [address] and [place] given to pt. Meds to beds for prescriptions. Pt states she has basinette for baby to sleep in. Infant safety reviewed with pt. Parents state they have all needed supplies for baby at home.

## 2023-01-12 NOTE — PROGRESS NOTES
CLINICAL PHARMACY NOTE: MEDS TO BEDS    Total # of Prescriptions Filled: 2   The following medications were delivered to the patient:  Ferrous sulfate 325 mg   Ibuprofen 600 mg       Additional Documentation:

## 2023-02-23 ENCOUNTER — POSTPARTUM VISIT (OUTPATIENT)
Dept: OBGYN | Age: 23
End: 2023-02-23
Payer: MEDICAID

## 2023-02-23 VITALS
WEIGHT: 109.4 LBS | BODY MASS INDEX: 21.37 KG/M2 | DIASTOLIC BLOOD PRESSURE: 59 MMHG | HEART RATE: 71 BPM | SYSTOLIC BLOOD PRESSURE: 104 MMHG

## 2023-02-23 PROBLEM — O46.8X9 SUBCHORIONIC HEMATOMA: Status: RESOLVED | Noted: 2022-09-15 | Resolved: 2023-02-23

## 2023-02-23 PROBLEM — O42.90 AMNIOTIC FLUID LEAKING: Status: RESOLVED | Noted: 2023-01-09 | Resolved: 2023-02-23

## 2023-02-23 PROBLEM — O09.892 HIGH RISK TEEN PREGNANCY IN SECOND TRIMESTER: Status: RESOLVED | Noted: 2022-08-04 | Resolved: 2023-02-23

## 2023-02-23 PROBLEM — Z34.00 SUPERVISION OF NORMAL FIRST PREGNANCY: Status: RESOLVED | Noted: 2022-08-04 | Resolved: 2023-02-23

## 2023-02-23 PROBLEM — O41.8X90 SUBCHORIONIC HEMATOMA: Status: RESOLVED | Noted: 2022-09-15 | Resolved: 2023-02-23

## 2023-02-23 PROBLEM — O43.119 CIRCUMVALLATE PLACENTA: Status: RESOLVED | Noted: 2022-09-15 | Resolved: 2023-02-23

## 2023-02-23 PROBLEM — O47.03 PRETERM UTERINE CONTRACTIONS IN THIRD TRIMESTER, ANTEPARTUM: Status: RESOLVED | Noted: 2022-12-15 | Resolved: 2023-02-23

## 2023-02-23 PROBLEM — Z3A.38 38 WEEKS GESTATION OF PREGNANCY: Status: RESOLVED | Noted: 2023-01-09 | Resolved: 2023-02-23

## 2023-02-23 PROBLEM — R35.0 URINARY FREQUENCY: Status: RESOLVED | Noted: 2022-12-15 | Resolved: 2023-02-23

## 2023-02-23 PROBLEM — O44.40 LOW-LYING PLACENTA: Status: RESOLVED | Noted: 2022-09-15 | Resolved: 2023-02-23

## 2023-02-23 PROCEDURE — 99213 OFFICE O/P EST LOW 20 MIN: CPT | Performed by: MIDWIFE

## 2023-02-23 PROCEDURE — 99212 OFFICE O/P EST SF 10 MIN: CPT | Performed by: MIDWIFE

## 2023-02-23 SDOH — ECONOMIC STABILITY: INCOME INSECURITY: HOW HARD IS IT FOR YOU TO PAY FOR THE VERY BASICS LIKE FOOD, HOUSING, MEDICAL CARE, AND HEATING?: SOMEWHAT HARD

## 2023-02-23 SDOH — ECONOMIC STABILITY: HOUSING INSECURITY
IN THE LAST 12 MONTHS, WAS THERE A TIME WHEN YOU DID NOT HAVE A STEADY PLACE TO SLEEP OR SLEPT IN A SHELTER (INCLUDING NOW)?: NO

## 2023-02-23 SDOH — ECONOMIC STABILITY: FOOD INSECURITY: WITHIN THE PAST 12 MONTHS, YOU WORRIED THAT YOUR FOOD WOULD RUN OUT BEFORE YOU GOT MONEY TO BUY MORE.: NEVER TRUE

## 2023-02-23 SDOH — ECONOMIC STABILITY: FOOD INSECURITY: WITHIN THE PAST 12 MONTHS, THE FOOD YOU BOUGHT JUST DIDN'T LAST AND YOU DIDN'T HAVE MONEY TO GET MORE.: NEVER TRUE

## 2023-02-23 NOTE — PROGRESS NOTES
Subjective:      Alanis Colby is a 23 y.o.  female, Chapis Rivas presents with a complaint of Postpartum Care (Patient here for 6 wk PP pelvic exam.)        Current Complaints: Postpartum Visit  Patient here for postpartum visit. She is 6 weeks post partum following a spontaneous vaginal delivery. I have fully reviewed the prenatal and intrapartum course. The delivery was at 38w1d gestational weeks. Outcome: . Postpartum course has been uncomplicated. Baby's course has been doing well without problems. Baby is feeding bottle. Bleeding no bleeding. Bowel function is normal. Bladder function is normal. Patient is not sexually active. Postpartum depression screening: negative 0  LMP 23       Gynecologic History  Patient's last menstrual period was 2023 (exact date). OB History          1    Para   1    Term   1            AB        Living   1         SAB        IAB        Ectopic        Molar        Multiple   0    Live Births   1              Patient's medications, allergies, past medical, surgical, social and family histories were reviewed and updated as appropriate. Review of Systems  Constitutional: negative  Eyes: negative  Ears, nose, mouth, throat, and face: negative  Respiratory: negative  Cardiovascular: negative  Gastrointestinal: negative  Genitourinary:see above  Integument/breast: negative  Hematologic/lymphatic: negative  Musculoskeletal:negative  Neurological: negative  Behavioral/Psych: negative  Endocrine: negative  Allergic/Immunologic: negative     Objective:       BP (!) 104/59 (Site: Left Upper Arm, Position: Sitting)   Pulse 71   Wt 109 lb 6.4 oz (49.6 kg)   LMP 2023 (Exact Date)   Breastfeeding No   BMI 21.37 kg/m²   General appearance: alert, appears stated age, and cooperative  Head: Normocephalic, without obvious abnormality, atraumatic  Eyes: No gross abnormalities.   Ears: normal  external ear canals both ears  Neck: no adenopathy, supple, symmetrical, trachea midline, and thyroid not enlarged, symmetric, no tenderness/mass/nodules  Heart: Normal S1 and S2.  Regular rhythm. No murmurs, gallops, or rubs. Lungs: clear to auscultation bilaterally  Abdomen: soft, non-tender; bowel sounds normal; no masses,  no organomegaly  Back: symmetric, no curvature. ROM normal. No CVA tenderness. Breasts: normal appearance, no masses or tenderness  Pelvic: cervix normal in appearance, external genitalia normal, no adnexal masses or tenderness, no cervical motion tenderness, rectovaginal septum normal, uterus normal size, shape, and consistency, vagina normal without discharge  Urethra:hypermobile  Pelvic Floor:no cystocele, rectocele or prolapse noted  Extremities: extremities normal, atraumatic, no cyanosis or edema  Skin: Skin color, texture, turgor normal. No rashes or lesions     Assessment:   23 y.o.  female     Chief Complaint   Patient presents with    Postpartum Care     Patient here for 6 wk PP pelvic exam.       Patient Active Problem List   Diagnosis    Non-English speaking patient     (normal spontaneous vaginal delivery)          Plan:   Counseled on monthly breast self exam, instructions reviewed, call with any changes. Maintain a menstrual calendar and call if irregularities exist    Questions answered. Follow-up for annual exam.    No orders of the defined types were placed in this encounter.       REHANA Howard CNM 2023 8:51 PM

## 2023-02-23 NOTE — PROGRESS NOTES
Patient alert and pleasant with no complaints  Here today for 6 wk PP pelvic exam.  AMN  # [id number], DCITS available via IPAD for interpretation. PP Depression screening done with a score of 0. SDOH done. Assisted with pelvic exam, no specimens obtained. Discharge instructions have been discussed with the patient. Patient advised to call our office with any questions or concerns. Voiced understanding.

## 2023-09-28 ENCOUNTER — OFFICE VISIT (OUTPATIENT)
Dept: OBGYN | Age: 23
End: 2023-09-28

## 2023-09-28 ENCOUNTER — INITIAL PRENATAL (OUTPATIENT)
Dept: OBGYN | Age: 23
End: 2023-09-28

## 2023-09-28 VITALS
HEART RATE: 76 BPM | WEIGHT: 109 LBS | DIASTOLIC BLOOD PRESSURE: 51 MMHG | BODY MASS INDEX: 21.29 KG/M2 | SYSTOLIC BLOOD PRESSURE: 101 MMHG

## 2023-09-28 DIAGNOSIS — Z34.93 PRENATAL CARE, THIRD TRIMESTER: Primary | ICD-10-CM

## 2023-09-28 DIAGNOSIS — Z34.92 PRENATAL CARE IN SECOND TRIMESTER: Primary | ICD-10-CM

## 2023-09-28 LAB
ABSOLUTE IMMATURE GRANULOCYTE: <0.03 K/UL (ref 0–0.58)
AMPHETAMINE SCREEN URINE: NEGATIVE
BARBITURATE SCREEN URINE: NEGATIVE
BASOPHILS ABSOLUTE: 0.01 K/UL (ref 0–0.2)
BASOPHILS RELATIVE PERCENT: 0 % (ref 0–2)
BENZODIAZEPINE SCREEN, URINE: NEGATIVE
BUPRENORPHINE URINE: NEGATIVE
CANNABINOID SCREEN URINE: NEGATIVE
COCAINE METABOLITE, URINE: NEGATIVE
EOSINOPHILS ABSOLUTE: 0.07 K/UL (ref 0.05–0.5)
EOSINOPHILS RELATIVE PERCENT: 1 % (ref 0–6)
FENTANYL URINE: NEGATIVE
GLUCOSE URINE, POC: NEGATIVE
HCT VFR BLD CALC: 32.5 % (ref 34–48)
HEMOGLOBIN: 9.7 G/DL (ref 11.5–15.5)
IMMATURE GRANULOCYTES: 0 % (ref 0–5)
LYMPHOCYTES ABSOLUTE: 1.75 K/UL (ref 1.5–4)
LYMPHOCYTES RELATIVE PERCENT: 24 % (ref 20–42)
MCH RBC QN AUTO: 23.7 PG (ref 26–35)
MCHC RBC AUTO-ENTMCNC: 29.8 G/DL (ref 32–34.5)
MCV RBC AUTO: 79.5 FL (ref 80–99.9)
METHADONE SCREEN, URINE: NEGATIVE
MONOCYTES ABSOLUTE: 0.68 K/UL (ref 0.1–0.95)
MONOCYTES RELATIVE PERCENT: 9 % (ref 2–12)
NEUTROPHILS ABSOLUTE: 4.92 K/UL (ref 1.8–7.3)
NEUTROPHILS RELATIVE PERCENT: 66 % (ref 43–80)
OPIATES, URINE: NEGATIVE
OXYCODONE SCREEN URINE: NEGATIVE
PDW BLD-RTO: 14.8 % (ref 11.5–15)
PHENCYCLIDINE, URINE: NEGATIVE
PLATELET # BLD: 383 K/UL (ref 130–450)
PMV BLD AUTO: 10.1 FL (ref 7–12)
PROTEIN UA: NEGATIVE
RBC # BLD: 4.09 M/UL (ref 3.5–5.5)
TEST INFORMATION: NORMAL
WBC # BLD: 7.5 K/UL (ref 4.5–11.5)

## 2023-09-28 PROCEDURE — 36415 COLL VENOUS BLD VENIPUNCTURE: CPT | Performed by: MIDWIFE

## 2023-09-28 PROCEDURE — 81002 URINALYSIS NONAUTO W/O SCOPE: CPT | Performed by: MIDWIFE

## 2023-09-28 PROCEDURE — 99212 OFFICE O/P EST SF 10 MIN: CPT | Performed by: MIDWIFE

## 2023-09-28 PROCEDURE — 99213 OFFICE O/P EST LOW 20 MIN: CPT | Performed by: MIDWIFE

## 2023-09-28 RX ORDER — PNV NO.95/FERROUS FUM/FOLIC AC 28MG-0.8MG
1 TABLET ORAL
Qty: 30 TABLET | Refills: 12 | Status: SHIPPED | OUTPATIENT
Start: 2023-09-28

## 2023-09-28 NOTE — PROGRESS NOTES
Patient alert and pleasant with no complaints. Here today for initial prenatal visit. AMN  #[id number], [employer], available via iPAD for interpretation. Fetal heart tones obtained without difficulty. Urine for glucose and protein obtained with negative results. Blood work ordered, drawn, labeled and sent to the lab. Discharge instructions have been discussed with the patient. Patient advised to call our office with any questions or concerns. Voiced understanding.
[facility] SETH Introduced patient to Centering Pregnancy program. Patient interested in enrolling.
next visit. This includes a HH, 1 hr GTT, U/A, RPR, Direct Vargas. The patient is to complete this between 26 to 28 weeks. The S/S of Pre-Eclampsia were reviewed with the patient. She is to report headache not relieved by rest, fluids, or tylenol, increase in edema, epigastric pain, or visual disturbances if they occur. Continue Medications as ordered. Total face to face time 15 minutes, greater than 50% spent on counseling the patient or coordinating her care, or discussing complications and problems related to her pregnancy. I answered all of her questions to her satisfaction.       REHANA Brasher CNM

## 2023-09-29 LAB
ABO/RH: NORMAL
ANTIBODY SCREEN: NEGATIVE
ARM BAND NUMBER: NORMAL
BLOOD BANK SAMPLE EXPIRATION: NORMAL
HBV SURFACE AB TITR SER: 15.58 MIU/ML (ref 0–9.99)
HEPATITIS B SURF AG,XHBAGS: NONREACTIVE
HIV AG/AB: NONREACTIVE
RPR: NONREACTIVE

## 2023-10-02 LAB
C. TRACHOMATIS DNA ,URINE: NEGATIVE
N. GONORRHOEAE DNA, URINE: NEGATIVE
VZV IGG SER QL IA: ABNORMAL

## 2023-10-03 LAB
HGB ELECTROPHORESIS INTERP: NORMAL
PATHOLOGIST: NORMAL

## 2023-10-13 NOTE — PROGRESS NOTES
94/48/99  Duplicate note  Ankur Alonso is a  female 26w4d  Initial prenatal visit  Short interval between pregnancy, 23,         PAST OB HISTORY                    OB History    Para Term  AB Living   2 1 1 0 0 1   SAB IAB Ectopic Molar Multiple Live Births    0 0 0 0 0 1       # Outcome Date GA Lbr Star/2nd Weight Sex Delivery Anes PTL Lv   2 Current                     1 Term 23 38w1d / 01:25 6 lb 14.1 oz (3.12 kg) M Vag-Spont EPI N ORESTES      Complications: Cord around body      Name: Tasha Hanson: Macrina  Apgar5: 9         PAST MEDICAL HISTORY  Past Medical History        Past Medical History:   Diagnosis Date    34 weeks gestation of pregnancy 12/15/2022    38 weeks gestation of pregnancy 2023    Amniotic fluid leaking 2023    Circumvallate placenta 9/15/2022    Low-lying placenta 9/15/2022     uterine contractions in third trimester, antepartum 12/15/2022    Subchorionic hematoma 9/15/2022    Supervision of normal first pregnancy 2022    Urinary frequency 12/15/2022               There was positive fetal movements. No contractions or leakage of fluid. She currently denies any bleeding, cramping, or contractions. No complaints of abdominal pain. The patient was seen and evaluated. Physical Exam:  Alert, cooperative, oriented to time and place  Abdomen soft non-tender  Skin warm and dry, no peripheral edema noted. See flow sheet  Mother's Prenatal Vitals  BP: (!) 101/51  Weight - Scale: 109 lb (49.4 kg)  Pulse: 76  Patient Position: Sitting  Prenatal Fetal Information  Fetal HR: 145  Movement: Present           Glucola screening  next visit     The patients fetal anatomy ultrasound was ordered. The results of the ultrasound will be reviewed.       Assessment:  IUP 26w4d weeks  Size  = to dates  Short interval between pregnancies        Plan:  The patient will return to office in 2 weeks  Obtain a Maternal

## 2023-10-16 ENCOUNTER — TELEPHONE (OUTPATIENT)
Dept: OBGYN | Age: 23
End: 2023-10-16

## 2023-10-19 ENCOUNTER — ROUTINE PRENATAL (OUTPATIENT)
Dept: OBGYN | Age: 23
End: 2023-10-19

## 2023-10-19 VITALS
DIASTOLIC BLOOD PRESSURE: 45 MMHG | HEART RATE: 72 BPM | BODY MASS INDEX: 21.93 KG/M2 | SYSTOLIC BLOOD PRESSURE: 78 MMHG | WEIGHT: 112.3 LBS

## 2023-10-19 DIAGNOSIS — N30.00 ACUTE CYSTITIS WITHOUT HEMATURIA: ICD-10-CM

## 2023-10-19 DIAGNOSIS — Z34.93 PRENATAL CARE, THIRD TRIMESTER: Primary | ICD-10-CM

## 2023-10-19 LAB
BILIRUBIN URINE: NEGATIVE
COLOR: YELLOW
COMMENT: ABNORMAL
DAT, POLYSPECIFIC: NEGATIVE
GLUCOSE BLD-MCNC: 146 MG/DL (ref 74–99)
GLUCOSE URINE, POC: NEGATIVE
GLUCOSE URINE: 100 MG/DL
HCT VFR BLD CALC: 31.5 % (ref 34–48)
HEMOGLOBIN: 9.2 G/DL (ref 11.5–15.5)
KETONES, URINE: NEGATIVE MG/DL
LEUKOCYTE ESTERASE, URINE: NEGATIVE
MCH RBC QN AUTO: 23.5 PG (ref 26–35)
MCHC RBC AUTO-ENTMCNC: 29.2 G/DL (ref 32–34.5)
MCV RBC AUTO: 80.6 FL (ref 80–99.9)
NITRITE, URINE: NEGATIVE
PDW BLD-RTO: 15.1 % (ref 11.5–15)
PH UA: 6 (ref 5–9)
PLATELET # BLD: 363 K/UL (ref 130–450)
PMV BLD AUTO: 10 FL (ref 7–12)
PROTEIN UA: NEGATIVE
PROTEIN UA: NEGATIVE MG/DL
RBC # BLD: 3.91 M/UL (ref 3.5–5.5)
SPECIFIC GRAVITY UA: 1.01 (ref 1–1.03)
TURBIDITY: CLEAR
URINE HGB: NEGATIVE
UROBILINOGEN, URINE: 1 EU/DL (ref 0–1)
WBC # BLD: 7.6 K/UL (ref 4.5–11.5)

## 2023-10-19 PROCEDURE — 36415 COLL VENOUS BLD VENIPUNCTURE: CPT | Performed by: MIDWIFE

## 2023-10-19 PROCEDURE — 99213 OFFICE O/P EST LOW 20 MIN: CPT | Performed by: MIDWIFE

## 2023-10-19 PROCEDURE — 81002 URINALYSIS NONAUTO W/O SCOPE: CPT | Performed by: MIDWIFE

## 2023-10-19 NOTE — PROGRESS NOTES
Patient attended Centering Pregnancy session. Educated patient on Comforts during labor. Activity: art and crafts.

## 2023-10-19 NOTE — PROGRESS NOTES
27-35 weeksCentering In Pregnancy: Session 5: Comforts During Labor, Knowing When to [address] Brower is a 23 y.o. female 29w4d      OB History    Para Term  AB Living   2 1 1 0 0 1   SAB IAB Ectopic Molar Multiple Live Births   0 0 0 0 0 1      # Outcome Date GA Lbr Star/2nd Weight Sex Delivery Anes PTL Lv   2 Current            1 Term 23 38w1d / 01:25 3.12 kg (6 lb 14.1 oz) M Vag-Spont EPI N ORESTES      Complications: Cord around body      Name: Nitin Sales: Macrina  Apgar5: 9          MEDICAL HISTORY  Past Medical History:   Diagnosis Date    34 weeks gestation of pregnancy 12/15/2022    38 weeks gestation of pregnancy 2023    Amniotic fluid leaking 2023    Circumvallate placenta 9/15/2022    Low-lying placenta 9/15/2022     uterine contractions in third trimester, antepartum 12/15/2022    Subchorionic hematoma 9/15/2022    Supervision of normal first pregnancy 2022    Urinary frequency 12/15/2022          There was positive fetal movements. No contractions or leakage of fluid. She currently denies any bleeding, cramping, or contractions. No complaints of abdominal pain. The patient was seen and evaluated. Physical Exam:   Alert, cooperative, oriented to time and place  Abdomen soft non-tender  Skin warm and dry, no peripheral edema noted. See flow sheet       The patient had her 28 week labs ordered. Completed at todays visit  BLOOD TYPING:  Lab Results   Component Value Date    LABANTI NEGATIVE 2023    Rebeccaside B POSITIVE 2023     RPR:  Lab Results   Component Value Date    LABRPR NONREACTIVE 2023       Glucola screening  todays visit    Flu and Tdap Vaccine discussed.      Assessment:  IUP at 29w4d  weeks  Size = to dates  The patient is RH positive Rhogam Ordered no  Short interval between pregnancies     Plan:  The patient will return to [facility] in Pregnancy for her next visit in 2

## 2023-10-19 NOTE — PROGRESS NOTES
Urine sample tested for glucose and protein results were negative    Blood work drawn from her right antecubital vein without difficulty. Green and Gold tubes spun  4 tubes labeled and sent to lab.     Urinalysis clean catch sample labeled and sent to the lab

## 2023-10-20 LAB
AMOUNT GLUCOSE GIVEN: NORMAL G
COLLECT TIME, 1HR GLUCOSE: 1302
GLUCOSE TOLERANCE TEST 1 HOUR: 146 MG/DL
RPR: NONREACTIVE

## 2023-10-26 ENCOUNTER — ANCILLARY PROCEDURE (OUTPATIENT)
Dept: OBGYN CLINIC | Age: 23
End: 2023-10-26

## 2023-10-26 ENCOUNTER — INITIAL PRENATAL (OUTPATIENT)
Dept: OBGYN CLINIC | Age: 23
End: 2023-10-26

## 2023-10-26 VITALS
SYSTOLIC BLOOD PRESSURE: 112 MMHG | BODY MASS INDEX: 21.7 KG/M2 | DIASTOLIC BLOOD PRESSURE: 80 MMHG | WEIGHT: 111.13 LBS | HEART RATE: 90 BPM

## 2023-10-26 DIAGNOSIS — O09.30 LATE PRENATAL CARE: ICD-10-CM

## 2023-10-26 DIAGNOSIS — O36.5930 POOR FETAL GROWTH AFFECTING MANAGEMENT OF MOTHER IN THIRD TRIMESTER, SINGLE OR UNSPECIFIED FETUS: ICD-10-CM

## 2023-10-26 DIAGNOSIS — Z34.90 SECOND PREGNANCY: ICD-10-CM

## 2023-10-26 DIAGNOSIS — Z3A.30 30 WEEKS GESTATION OF PREGNANCY: Primary | ICD-10-CM

## 2023-10-26 LAB
GLUCOSE URINE, POC: NEGATIVE
PROTEIN UA: NEGATIVE

## 2023-10-26 PROCEDURE — 99999 PR OFFICE/OUTPT VISIT,PROCEDURE ONLY: CPT | Performed by: OBSTETRICS & GYNECOLOGY

## 2023-10-26 PROCEDURE — 99203 OFFICE O/P NEW LOW 30 MIN: CPT | Performed by: OBSTETRICS & GYNECOLOGY

## 2023-10-26 PROCEDURE — 81002 URINALYSIS NONAUTO W/O SCOPE: CPT | Performed by: OBSTETRICS & GYNECOLOGY

## 2023-10-26 PROCEDURE — 76811 OB US DETAILED SNGL FETUS: CPT | Performed by: OBSTETRICS & GYNECOLOGY

## 2023-10-26 PROCEDURE — 76820 UMBILICAL ARTERY ECHO: CPT | Performed by: OBSTETRICS & GYNECOLOGY

## 2023-10-26 NOTE — PATIENT INSTRUCTIONS
Please arrive for your scheduled appointment at least 15 minutes early with your actual insurance card+ a photo ID. Also if you need any refills ordered or have questions, it may take up 48 hours to reply. Please allow ample time for your refills. Call me when you use last refill. Thank you for your cooperation. Call your primary obstetrician with bleeding, leaking of fluid, abdominal tenderness, headache, blurry vision, epigastric pain and increased urinary frequency. If you are experiencing an emergency and need immediate help, call 911 or go to go emergency room or labor and delivery. Do kick counts after dinner. Call your primary obstetrician if less than 10 kicks in 2 hours after dinner. Call your primary obstetrician with bleeding, leaking of fluid, abdominal tenderness, headache, blurry vision, epigastric pain and increased urinary frequency. if you are sick, not feeling well or have an infectious process going on please reschedule your appointment by calling [phone]. Also if any family members are not feeling well, please do not bring them to your appointment. We appreciate your cooperation. We are doing this in order to protect our pregnant mothers+ their babies. if you are sick, not feeling well or have an infectious process going on please reschedule your appointment by calling [phone]. Also if any family members are not feeling well, please do not bring them to your appointment. We appreciate your cooperation. We are doing this in order to protect our pregnant mothers+ their babies.

## 2023-10-26 NOTE — PROGRESS NOTES
[address] [place] [address] FETAL MEDICINE  [address] [facility] [address]  Dept: [address] [facility] [address]: [phone]     10/26/2023    RE:  Jonel Bhakta     : 2000   AGE: 23 y.o. Dear Dr. Ben Siddiqui,    Thank you for allowing me to see Jonel Bhakta. As I'm sure you will recall, Jonel Bhakta is a 23 y.o. [id number]'s last menstrual period was 2023. Estimated Date of Delivery: 23 at 30w4d seen in our office today for the following:    REASON FOR VISIT: Level II    Patient Active Problem List    Diagnosis Date Noted    Non-English speaking patient 09/15/2022    30 weeks gestation of pregnancy 10/26/2023    Second pregnancy 10/26/2023        PAST HISTORY:  OB History    Para Term  AB Living   2 1 1     1   SAB IAB Ectopic Molar Multiple Live Births           0 1      # Outcome Date GA Lbr Star/2nd Weight Sex Delivery Anes PTL Lv   2 Current            1 Term 23 38w1d / 01:25 3.12 kg (6 lb 14.1 oz) M Vag-Spont EPI N ORESTES      Complications: Cord around body          MEDICAL:  Past Medical History:   Diagnosis Date    34 weeks gestation of pregnancy 12/15/2022    38 weeks gestation of pregnancy 2023    Amniotic fluid leaking 2023    Circumvallate placenta 9/15/2022    Low-lying placenta 9/15/2022     (normal spontaneous vaginal delivery) 2023     uterine contractions in third trimester, antepartum 12/15/2022    Subchorionic hematoma 9/15/2022    Supervision of normal first pregnancy 2022    Urinary frequency 12/15/2022        SURGICAL:  History reviewed. No pertinent surgical history.     ALLERGIES:    No Known Allergies      MEDICATIONS:    Current Outpatient Medications   Medication Sig Dispense Refill    Prenatal Vit-Fe Fumarate-FA (PRENATAL VITAMINS) 28-0.8 MG TABS Take 1 tablet by mouth daily 30 tablet 12     No current facility-administered

## 2023-10-26 NOTE — PROGRESS NOTES
Pt here for new patient ultrasound  Pt c/o irregular vanessa rebollar contractions  Pt states good fetal movement  Pt denies any bleeding or lof  Muzette our  used for office visit communication

## 2023-10-27 DIAGNOSIS — R73.09 ELEVATED GLUCOSE TOLERANCE TEST: Primary | ICD-10-CM

## 2023-11-01 ENCOUNTER — TELEPHONE (OUTPATIENT)
Dept: OBGYN | Age: 23
End: 2023-11-01

## 2023-11-01 NOTE — TELEPHONE ENCOUNTER
Called patient for Centering Pregnancy appointment remainder for 11/2/2023 at 9:30 am. Left voicemail for patient.

## 2023-11-02 ENCOUNTER — ROUTINE PRENATAL (OUTPATIENT)
Dept: OBGYN | Age: 23
End: 2023-11-02

## 2023-11-02 VITALS
SYSTOLIC BLOOD PRESSURE: 114 MMHG | DIASTOLIC BLOOD PRESSURE: 84 MMHG | HEART RATE: 81 BPM | WEIGHT: 114.9 LBS | BODY MASS INDEX: 22.44 KG/M2

## 2023-11-02 DIAGNOSIS — R73.09 ELEVATED GLUCOSE: ICD-10-CM

## 2023-11-02 DIAGNOSIS — Z3A.31 31 WEEKS GESTATION OF PREGNANCY: ICD-10-CM

## 2023-11-02 DIAGNOSIS — Z34.80 SUPERVISION OF OTHER NORMAL PREGNANCY, ANTEPARTUM: Primary | ICD-10-CM

## 2023-11-02 LAB
GLUCOSE URINE, POC: NEGATIVE
PROTEIN UA: NEGATIVE

## 2023-11-02 NOTE — PROGRESS NOTES
Patient attended Centering Pregnancy session. Educated patient on Labor decisions and the birth experience. Activity: Art and crafts.

## 2023-11-02 NOTE — PROGRESS NOTES
Centering In Pregnancy: Session 6: ABC Safe Sleep, Labor Decisions, The Birth Experience     Phyllis Guzman is a 23 y.o. female 31w4d    Visit done with help of       OB History    Para Term  AB Living   2 1 1 0 0 1   SAB IAB Ectopic Molar Multiple Live Births   0 0 0 0 0 1      # Outcome Date GA Lbr Star/2nd Weight Sex Delivery Anes PTL Lv   2 Current            1 Term 23 38w1d / 01:25 3.12 kg (6 lb 14.1 oz) M Vag-Spont EPI N ORESTES      Complications: Cord around body      Name: Nitin Sales: [employer] [facility] [facility]: [address]  No past medical history on file. There was positive fetal movements. No contractions or leakage of fluid. She currently denies any bleeding, cramping, or contractions. No complaints of abdominal pain. The patient was seen and evaluated. Physical Exam:   Alert, cooperative, oriented to time and place  Abdomen soft non-tender  Skin warm and dry, no peripheral edema noted. See flow sheet  Mother's Prenatal Vitals  BP: 114/84  Weight - Scale: 52.1 kg (114 lb 14.4 oz)  Pulse: 81  Patient Position: Sitting  Alb/Glu  Albumin: Negative  Glucose: Negative  Prenatal Fetal Information  Fundal Height (cm): 31 cm  Fetal HR: 145    The patient had her 28 week labs completed. BLOOD TYPING:  Lab Results   Component Value Date    LABANTI NEGATIVE 2023    Rebeccaside B POSITIVE 2023     RPR:  Lab Results   Component Value Date    LABRPR NONREACTIVE 10/19/2023       Glucola screening was completed:  Results reviewed. GLUCOSE TOLERANCE 1 HOUR 50G SCREEN:    Lab Results   Component Value Date/Time    GLUCOSE 146 10/19/2023 12:02 PM     Needs three hour . Pt seems unaware of this recommendation. Staff to help schedule. Order was already placed in chart.      Assessment:  IUP at 31w4d  weeks  Size < dates  Elevated 1 hour still needs to do three hour - instructions and appointment reviewed today  The [facility]

## 2023-11-03 NOTE — PROGRESS NOTES
Set up transportation for patient to complete blood work at [facility]. [facility]. Went through independent [employer] for transportation request. Shelby ANN Called and confirmed fax was sent over. Transportation set up for 9:00 am pick-up time at home for Monday, 11/6/23 and 11:00 pick-up at hospital to drop back off at house. Patient voiced understanding when Shelby ANN Spoke to her about pick-up and drop off times.

## 2023-11-06 ENCOUNTER — HOSPITAL ENCOUNTER (OUTPATIENT)
Age: 23
Discharge: HOME | End: 2023-11-06

## 2023-11-06 DIAGNOSIS — R73.09 ELEVATED GLUCOSE TOLERANCE TEST: ICD-10-CM

## 2023-11-06 LAB
AMOUNT GLUCOSE GIVEN: 100 G
COLLECT TIME, 1HR GLUCOSE: NORMAL
COLLECT TIME, 2HR GLUCOSE: NORMAL
COLLECT TIME, 3HR GLUCOSE: NORMAL
COLLECT TIME, FASTING GLUCOSE: 1011
GLUCOSE 2H P 100 G GLC PO SERPL-MCNC: 99 MG/DL
GLUCOSE 3H P 100 G GLC PO SERPL-MCNC: 145 MG/DL
GLUCOSE TOLERANCE TEST 2 HOUR: 130 MG/DL
GLUCOSE TOLERANCE TEST 3 HOUR: 115 MG/DL

## 2023-11-06 PROCEDURE — 82952 GTT-ADDED SAMPLES: CPT

## 2023-11-06 PROCEDURE — 36415 COLL VENOUS BLD VENIPUNCTURE: CPT

## 2023-11-06 PROCEDURE — 82951 GLUCOSE TOLERANCE TEST (GTT): CPT

## 2023-11-15 ENCOUNTER — TELEPHONE (OUTPATIENT)
Dept: OBGYN | Age: 23
End: 2023-11-15

## 2023-11-15 NOTE — TELEPHONE ENCOUNTER
Called patient for Centering Pregnancy appointment for 11/16/23 at 9:30 am. Patient confirmed appointment.

## 2023-11-16 ENCOUNTER — ROUTINE PRENATAL (OUTPATIENT)
Dept: OBGYN | Age: 23
End: 2023-11-16

## 2023-11-16 VITALS
WEIGHT: 113.6 LBS | BODY MASS INDEX: 22.19 KG/M2 | DIASTOLIC BLOOD PRESSURE: 65 MMHG | SYSTOLIC BLOOD PRESSURE: 118 MMHG | HEART RATE: 86 BPM

## 2023-11-16 DIAGNOSIS — R73.09 ELEVATED GLUCOSE: Primary | ICD-10-CM

## 2023-11-16 LAB
GLUCOSE URINE, POC: NEGATIVE
PROTEIN UA: NEGATIVE

## 2023-11-16 NOTE — PROGRESS NOTES
Educate patient on pediatric planning and dental care. Pantient receive baby nail clipper, medicine spoon and baby comb.
no  Short interval between pregnancies      Plan:  The patient will return to [facility] in Pregnancy for her next visit in 2 weeks. Maternal Fetal Medicine  follow-up for Return visit 11/17/23 for growth to confirm Dr Tianna Awan believes the appropriate ERIK of 1/23/2024 is.  30+ weeks with a most likely dating error and by this ultrasound her new ERIK should be 1/23/2024. The S/S of Pre-Eclampsia were reviewed with the patient. She is to report headache not relieved by rest, fluids, or tylenol, increase in edema, epigastric pain, or visual disturbances if they occur. Continue Medications as ordered. Total face to face time 15 minutes, greater than 50% spent on counseling the patient or coordinating her care, or discussing complications and problems related to her pregnancy. I answered all of her questions to her satisfaction.       REHANA Maciel - BERTHA

## 2023-11-22 ENCOUNTER — ROUTINE PRENATAL (OUTPATIENT)
Dept: OBGYN CLINIC | Age: 23
End: 2023-11-22

## 2023-11-22 ENCOUNTER — ANCILLARY PROCEDURE (OUTPATIENT)
Dept: OBGYN CLINIC | Age: 23
End: 2023-11-22

## 2023-11-22 VITALS
BODY MASS INDEX: 22.52 KG/M2 | SYSTOLIC BLOOD PRESSURE: 102 MMHG | WEIGHT: 115.3 LBS | HEART RATE: 85 BPM | DIASTOLIC BLOOD PRESSURE: 67 MMHG

## 2023-11-22 DIAGNOSIS — R73.09 ELEVATED GLUCOSE: Primary | ICD-10-CM

## 2023-11-22 DIAGNOSIS — Z3A.31 31 WEEKS GESTATION OF PREGNANCY: ICD-10-CM

## 2023-11-22 LAB
GLUCOSE URINE, POC: NEGATIVE
PROTEIN UA: NEGATIVE

## 2023-11-22 PROCEDURE — 76816 OB US FOLLOW-UP PER FETUS: CPT | Performed by: OBSTETRICS & GYNECOLOGY

## 2023-11-22 PROCEDURE — 99999 PR OFFICE/OUTPT VISIT,PROCEDURE ONLY: CPT | Performed by: OBSTETRICS & GYNECOLOGY

## 2023-11-22 PROCEDURE — 99213 OFFICE O/P EST LOW 20 MIN: CPT | Performed by: OBSTETRICS & GYNECOLOGY

## 2023-11-22 PROCEDURE — 81002 URINALYSIS NONAUTO W/O SCOPE: CPT | Performed by: OBSTETRICS & GYNECOLOGY

## 2023-11-22 PROCEDURE — 76820 UMBILICAL ARTERY ECHO: CPT | Performed by: OBSTETRICS & GYNECOLOGY

## 2023-11-22 NOTE — PROGRESS NOTES
Pt here for 3wk f/u. She denies bleeding/cramping/lof. She has no concerns. Good fetal movement. Session code 71004, Mandi Cerda for patient appointment her number code was [id number].

## 2023-11-22 NOTE — PROGRESS NOTES
[address] [place] [address] FETAL MEDICINE  [address] [facility]  [facility] [address]  Dept: [phone]  Loc: [phone]     2023    RE:  Jonel Bhakta     : 2000   AGE: 26 y.o. Dear Dr. Radha Vanegas,    Thank you for allowing me to see Jonel Bhakta. As I'm sure you will recall, Jonel Bhakta is a 26 y.o. [id number]'s last menstrual period was 2023. Estimated Date of Delivery: 24 at 31w1d seen in our office today for the following:    REASON FOR VISIT: Growth     Patient Active Problem List    Diagnosis Date Noted    Non-English speaking patient 09/15/2022    Elevated glucose 2023    31 weeks gestation of pregnancy 10/26/2023    Second pregnancy 10/26/2023        PAST HISTORY:  OB History    Para Term  AB Living   2 1 1     1   SAB IAB Ectopic Molar Multiple Live Births           0 1      # Outcome Date GA Lbr Star/2nd Weight Sex Delivery Anes PTL Lv   2 Current            1 Term 23 38w1d / 01:25 3.12 kg (6 lb 14.1 oz) M Vag-Spont EPI N ORESTES      Complications: Cord around body          MEDICAL:  History reviewed. No pertinent past medical history. SURGICAL:  History reviewed. No pertinent surgical history. ALLERGIES:    No Known Allergies      MEDICATIONS:    Current Outpatient Medications   Medication Sig Dispense Refill    Prenatal Vit-Fe Fumarate-FA (PRENATAL VITAMINS) 28-0.8 MG TABS Take 1 tablet by mouth daily 30 tablet 12     No current facility-administered medications for this visit.         Social History     Socioeconomic History    Marital status: Single     Spouse name: None    Number of children: None    Years of education: None    Highest education level: None   Tobacco Use    Smoking status: Never    Smokeless tobacco: Never   Vaping Use    Vaping Use: Never used   Substance and Sexual Activity    Alcohol use: Never    Drug use: Never    Sexual

## 2023-11-22 NOTE — PATIENT INSTRUCTIONS

## 2023-11-29 ENCOUNTER — TELEPHONE (OUTPATIENT)
Dept: OBGYN | Age: 23
End: 2023-11-29

## 2023-11-29 NOTE — TELEPHONE ENCOUNTER
Called patient for Centering Pregnancy appointment for 11/30/23 at 9:30 am. Could not leave voice mail for patient. Voicemail not set up.

## 2023-11-30 ENCOUNTER — ROUTINE PRENATAL (OUTPATIENT)
Dept: OBGYN | Age: 23
End: 2023-11-30

## 2023-11-30 VITALS
SYSTOLIC BLOOD PRESSURE: 114 MMHG | DIASTOLIC BLOOD PRESSURE: 56 MMHG | BODY MASS INDEX: 22.56 KG/M2 | HEART RATE: 83 BPM | WEIGHT: 115.5 LBS

## 2023-11-30 DIAGNOSIS — Z3A.32 32 WEEKS GESTATION OF PREGNANCY: ICD-10-CM

## 2023-11-30 DIAGNOSIS — R73.09 ELEVATED GLUCOSE: Primary | ICD-10-CM

## 2023-11-30 LAB
GLUCOSE URINE, POC: NEGATIVE
PROTEIN UA: NEGATIVE

## 2023-11-30 NOTE — PROGRESS NOTES
CC: Routine prenatal physical visit  HPI:  Patient is 32w2d based on ultrasound. Centering Session Number/Title    Session 8: Kick Counts, Baby Blues, Pregnancy - When to Call      Patient has the following obstetric history   OB History          2    Para   1    Term   1            AB        Living   1         SAB        IAB        Ectopic        Molar        Multiple   0    Live Births   1              Patient Active Problem List    Diagnosis Date Noted    Non-English speaking patient 09/15/2022    Elevated glucose 2023    31 weeks gestation of pregnancy 10/26/2023    Second pregnancy 10/26/2023     Subjective: Patient has no complaints or concerns at this time. reports fetal movement. Patient denies headaches, contractions, cramping, increased vaginal discharge, leaking of fluid and vaginal bleeding. Objective: see prenatal vitals and physical  Abdomen: soft, gravid, non-tender  Patient alert and oriented times three and in no apparent distress  Assessment:   1. Elevated glucose      Plan:    Pt reports +FM, denies lof, vb or decreased fm. She is having itching to her right eye-recommending otc claritin or allegra. She is also having complaints of tooth pain and requesting pain medication. She has tried tylenol without relief. Recommended she make an appointment with dentist. We will work on setting her up with an appointment today. CenteringPregnancy was reviewed with patient. Patient will placed in appropriate Centering group for gestational age. Patient made aware that urine drug screen is collected on all pregnant patients at first visit. Patient gives verbal consent to urine drug testing. Patient is aware that results will be given to our LSW and necessary referrals made if indicated. Prenatal genetic screening options reviewed with patient.  Patient is declining non-invasive prenatal genetic screening understanding its limitations, risks, benefits and

## 2023-11-30 NOTE — PROGRESS NOTES
Educate patient on Pregnancy to parenting transition, Kick counts, and ABCD safe sleep. Pantient receive Pack N Play and a baby sleep gown.

## 2023-12-12 ENCOUNTER — TELEPHONE (OUTPATIENT)
Dept: OBGYN | Age: 23
End: 2023-12-12

## 2023-12-12 NOTE — TELEPHONE ENCOUNTER
Called patient for Centering Pregnancy appointment for 12/14/2023 at 9:30 am. Patient confirmed appointment.

## 2023-12-14 ENCOUNTER — ROUTINE PRENATAL (OUTPATIENT)
Dept: OBGYN | Age: 23
End: 2023-12-14

## 2023-12-14 VITALS
SYSTOLIC BLOOD PRESSURE: 99 MMHG | WEIGHT: 115.7 LBS | HEART RATE: 75 BPM | BODY MASS INDEX: 22.6 KG/M2 | DIASTOLIC BLOOD PRESSURE: 58 MMHG

## 2023-12-14 DIAGNOSIS — Z34.93 PRENATAL CARE, THIRD TRIMESTER: ICD-10-CM

## 2023-12-14 DIAGNOSIS — R73.09 ELEVATED GLUCOSE: Primary | ICD-10-CM

## 2023-12-14 PROBLEM — Z3A.31 31 WEEKS GESTATION OF PREGNANCY: Status: RESOLVED | Noted: 2023-10-26 | Resolved: 2023-12-14

## 2023-12-14 LAB
GLUCOSE URINE, POC: NEGATIVE
PROTEIN UA: NEGATIVE

## 2023-12-14 PROCEDURE — 99213 OFFICE O/P EST LOW 20 MIN: CPT | Performed by: MIDWIFE

## 2023-12-14 PROCEDURE — 81002 URINALYSIS NONAUTO W/O SCOPE: CPT | Performed by: MIDWIFE

## 2023-12-14 RX ORDER — PNV NO.95/FERROUS FUM/FOLIC AC 28MG-0.8MG
1 TABLET ORAL
Qty: 30 TABLET | Refills: 12 | Status: SHIPPED | OUTPATIENT
Start: 2023-12-14

## 2023-12-14 NOTE — PROGRESS NOTES
Educate patient on  safety and carseat training. Patient sign carseat release form. Patient received carseat and activity personalizing oneasis .

## 2023-12-14 NOTE — PROGRESS NOTES
27-35 weeksCentering In Pregnancy: Session 9:  Safety and Soothing Techniques for Infant     Reagan Pena is a 26 y.o. female 34w2d      OB History    Para Term  AB Living   2 1 1 0 0 1   SAB IAB Ectopic Molar Multiple Live Births   0 0 0 0 0 1      # Outcome Date GA Lbr Star/2nd Weight Sex Delivery Anes PTL Lv   2 Current            1 Term 23 38w1d / 01:25 3.12 kg (6 lb 14.1 oz) M Vag-Spont EPI N ORESTES      Complications: Cord around body      Name: Jaimee Avalos: Macrina  Apgar5: [address] [facility] [place]  No past medical history on file. There was positive fetal movements. No contractions or leakage of fluid. She currently denies any bleeding, cramping, or contractions. No complaints of abdominal pain. The patient was seen and evaluated. Physical Exam:   Alert, cooperative, oriented to time and place  Abdomen soft non-tender  Skin warm and dry, no peripheral edema noted. See flow sheet  Mother's Prenatal Vitals  BP: (!) 99/58  Weight - Scale: 52.5 kg (115 lb 11.2 oz)  Pulse: 75  Patient Position: Sitting  Alb/Glu  Albumin: Negative  Glucose: Negative  Prenatal Fetal Information  Fundal Height (cm): 34 cm  Fetal HR: 130  Movement: Present    FH 34      The patient had her 28 week labs completed. BLOOD TYPING:  Lab Results   Component Value Date    LABANTI NEGATIVE 2023    Rebeccaside B POSITIVE 2023     RPR:  Lab Results   Component Value Date    LABRPR NONREACTIVE 10/19/2023       Glucola screening was completed:  Results reviewed. GLUCOSE TOLERANCE 2 HOUR 75G LAB:    Lab Results   Component Value Date/Time    LKXXDRG1UX 145 2023 10:11 AM    EPEHCSR5UT 130 2023 10:11 AM     Tdap Vaccine discussed.      Assessment:  IUP at 34w2d  weeks  Size = to dates  The patient is RH positive Rhogam Ordered no  Short interval between pregnancies     Plan:  The patient will return to [facility] in Pregnancy for her next

## 2023-12-18 PROBLEM — Z3A.34 34 WEEKS GESTATION OF PREGNANCY: Status: ACTIVE | Noted: 2023-12-18

## 2023-12-27 ENCOUNTER — TELEPHONE (OUTPATIENT)
Dept: OBGYN | Age: 23
End: 2023-12-27

## 2023-12-27 NOTE — TELEPHONE ENCOUNTER
Called patient for Centering Pregnancy appointment for 12/28/2023 at 9:30 am. Patient confirmed appointment.

## 2023-12-28 ENCOUNTER — ROUTINE PRENATAL (OUTPATIENT)
Dept: OBGYN | Age: 23
End: 2023-12-28

## 2023-12-28 VITALS
WEIGHT: 117.5 LBS | BODY MASS INDEX: 22.95 KG/M2 | SYSTOLIC BLOOD PRESSURE: 103 MMHG | HEART RATE: 72 BPM | DIASTOLIC BLOOD PRESSURE: 59 MMHG

## 2023-12-28 DIAGNOSIS — R73.09 ELEVATED GLUCOSE: Primary | ICD-10-CM

## 2023-12-28 DIAGNOSIS — Z34.93 PRENATAL CARE, THIRD TRIMESTER: ICD-10-CM

## 2023-12-28 DIAGNOSIS — Z3A.36 36 WEEKS GESTATION OF PREGNANCY: ICD-10-CM

## 2023-12-28 LAB
C TRACH DNA GENITAL QL NAA+PROBE: NORMAL
GLUCOSE URINE, POC: NEGATIVE
N. GONORRHOEAE DNA: NORMAL
PROTEIN UA: NEGATIVE

## 2023-12-30 ENCOUNTER — HOSPITAL ENCOUNTER (INPATIENT)
Age: 23
LOS: 2 days | Discharge: HOME | End: 2024-01-01
Attending: OBSTETRICS & GYNECOLOGY | Admitting: OBSTETRICS & GYNECOLOGY
Payer: MEDICAID

## 2023-12-30 ENCOUNTER — ANESTHESIA EVENT (OUTPATIENT)
Dept: MOTHER INFANT UNIT | Age: 23
End: 2023-12-30
Payer: MEDICAID

## 2023-12-30 ENCOUNTER — ANESTHESIA (OUTPATIENT)
Dept: MOTHER INFANT UNIT | Age: 23
End: 2023-12-30
Payer: MEDICAID

## 2023-12-30 PROBLEM — Z3A.36 36 WEEKS GESTATION OF PREGNANCY: Status: ACTIVE | Noted: 2023-12-30

## 2023-12-30 LAB
ABO + RH BLD: NORMAL
AMPHET UR QL SCN: NEGATIVE
ARM BAND NUMBER: NORMAL
BARBITURATES UR QL SCN: NEGATIVE
BENZODIAZ UR QL: NEGATIVE
BLOOD BANK SAMPLE EXPIRATION: NORMAL
BLOOD GROUP ANTIBODIES SERPL: NEGATIVE
BUPRENORPHINE UR QL: NEGATIVE
CANNABINOIDS UR QL SCN: NEGATIVE
COCAINE UR QL SCN: NEGATIVE
ERYTHROCYTE [DISTWIDTH] IN BLOOD BY AUTOMATED COUNT: 15.9 % (ref 11.5–15)
FENTANYL UR QL: NEGATIVE
HCT VFR BLD AUTO: 38.9 % (ref 34–48)
HGB BLD-MCNC: 11.8 G/DL (ref 11.5–15.5)
MCH RBC QN AUTO: 23 PG (ref 26–35)
MCHC RBC AUTO-ENTMCNC: 30.3 G/DL (ref 32–34.5)
MCV RBC AUTO: 76 FL (ref 80–99.9)
METHADONE UR QL: NEGATIVE
OPIATES UR QL SCN: NEGATIVE
OXYCODONE UR QL SCN: NEGATIVE
PCP UR QL SCN: NEGATIVE
PLATELET # BLD AUTO: 324 K/UL (ref 130–450)
PMV BLD AUTO: 9.8 FL (ref 7–12)
RBC # BLD AUTO: 5.12 M/UL (ref 3.5–5.5)
TEST INFORMATION: NORMAL
WBC OTHER # BLD: 7 K/UL (ref 4.5–11.5)

## 2023-12-30 PROCEDURE — 2500000003 HC RX 250 WO HCPCS

## 2023-12-30 PROCEDURE — 86850 RBC ANTIBODY SCREEN: CPT

## 2023-12-30 PROCEDURE — 86901 BLOOD TYPING SEROLOGIC RH(D): CPT

## 2023-12-30 PROCEDURE — 7200000001 HC VAGINAL DELIVERY

## 2023-12-30 PROCEDURE — 6370000000 HC RX 637 (ALT 250 FOR IP): Performed by: ADVANCED PRACTICE MIDWIFE

## 2023-12-30 PROCEDURE — 2580000003 HC RX 258: Performed by: OBSTETRICS & GYNECOLOGY

## 2023-12-30 PROCEDURE — 86762 RUBELLA ANTIBODY: CPT

## 2023-12-30 PROCEDURE — 86900 BLOOD TYPING SEROLOGIC ABO: CPT

## 2023-12-30 PROCEDURE — 86765 RUBEOLA ANTIBODY: CPT

## 2023-12-30 PROCEDURE — 3E0234Z INTRODUCTION OF SERUM, TOXOID AND VACCINE INTO MUSCLE, PERCUTANEOUS APPROACH: ICD-10-PCS | Performed by: OBSTETRICS & GYNECOLOGY

## 2023-12-30 PROCEDURE — 88307 TISSUE EXAM BY PATHOLOGIST: CPT

## 2023-12-30 PROCEDURE — 80307 DRUG TEST PRSMV CHEM ANLYZR: CPT

## 2023-12-30 PROCEDURE — 86735 MUMPS ANTIBODY: CPT

## 2023-12-30 PROCEDURE — 0KQM0ZZ REPAIR PERINEUM MUSCLE, OPEN APPROACH: ICD-10-PCS | Performed by: STUDENT IN AN ORGANIZED HEALTH CARE EDUCATION/TRAINING PROGRAM

## 2023-12-30 PROCEDURE — 1220000000 HC SEMI PRIVATE OB R&B

## 2023-12-30 PROCEDURE — 85027 COMPLETE CBC AUTOMATED: CPT

## 2023-12-30 PROCEDURE — 6360000002 HC RX W HCPCS: Performed by: OBSTETRICS & GYNECOLOGY

## 2023-12-30 RX ORDER — LIDOCAINE HYDROCHLORIDE 10 MG/ML
INJECTION, SOLUTION INFILTRATION; PERINEURAL
Status: COMPLETED
Start: 2023-12-30 | End: 2023-12-30

## 2023-12-30 RX ORDER — SODIUM CHLORIDE, SODIUM LACTATE, POTASSIUM CHLORIDE, AND CALCIUM CHLORIDE .6; .31; .03; .02 G/100ML; G/100ML; G/100ML; G/100ML
1000 INJECTION, SOLUTION INTRAVENOUS PRN
Status: DISCONTINUED | OUTPATIENT
Start: 2023-12-30 | End: 2024-01-01 | Stop reason: HOSPADM

## 2023-12-30 RX ORDER — IBUPROFEN 800 MG/1
800 TABLET ORAL EVERY 8 HOURS SCHEDULED
Status: DISCONTINUED | OUTPATIENT
Start: 2023-12-30 | End: 2024-01-01 | Stop reason: HOSPADM

## 2023-12-30 RX ORDER — SODIUM CHLORIDE, SODIUM LACTATE, POTASSIUM CHLORIDE, AND CALCIUM CHLORIDE .6; .31; .03; .02 G/100ML; G/100ML; G/100ML; G/100ML
500 INJECTION, SOLUTION INTRAVENOUS PRN
Status: DISCONTINUED | OUTPATIENT
Start: 2023-12-30 | End: 2024-01-01 | Stop reason: HOSPADM

## 2023-12-30 RX ORDER — FERROUS SULFATE 325(65) MG
325 TABLET ORAL EVERY OTHER DAY
Status: DISCONTINUED | OUTPATIENT
Start: 2023-12-30 | End: 2024-01-01 | Stop reason: HOSPADM

## 2023-12-30 RX ORDER — PENICILLIN G POTASSIUM 5000000 [IU]/1
INJECTION, POWDER, FOR SOLUTION INTRAMUSCULAR; INTRAVENOUS
Status: DISPENSED
Start: 2023-12-30 | End: 2023-12-30

## 2023-12-30 RX ORDER — ONDANSETRON 2 MG/ML
4 INJECTION INTRAMUSCULAR; INTRAVENOUS EVERY 6 HOURS PRN
Status: DISCONTINUED | OUTPATIENT
Start: 2023-12-30 | End: 2024-01-01 | Stop reason: HOSPADM

## 2023-12-30 RX ORDER — ACETAMINOPHEN 500 MG
1000 TABLET ORAL EVERY 8 HOURS SCHEDULED
Status: DISCONTINUED | OUTPATIENT
Start: 2023-12-30 | End: 2024-01-01 | Stop reason: HOSPADM

## 2023-12-30 RX ORDER — SODIUM CHLORIDE 0.9 % (FLUSH) 0.9 %
5-40 SYRINGE (ML) INJECTION PRN
Status: DISCONTINUED | OUTPATIENT
Start: 2023-12-30 | End: 2024-01-01 | Stop reason: HOSPADM

## 2023-12-30 RX ORDER — PENICILLIN G 3000000 [IU]/50ML
3 INJECTION, SOLUTION INTRAVENOUS EVERY 4 HOURS
Status: DISCONTINUED | OUTPATIENT
Start: 2023-12-30 | End: 2023-12-30

## 2023-12-30 RX ORDER — SODIUM CHLORIDE, SODIUM LACTATE, POTASSIUM CHLORIDE, CALCIUM CHLORIDE 600; 310; 30; 20 MG/100ML; MG/100ML; MG/100ML; MG/100ML
INJECTION, SOLUTION INTRAVENOUS CONTINUOUS
Status: DISCONTINUED | OUTPATIENT
Start: 2023-12-30 | End: 2024-01-01 | Stop reason: HOSPADM

## 2023-12-30 RX ORDER — DOCUSATE SODIUM 100 MG/1
100 CAPSULE, LIQUID FILLED ORAL 2 TIMES DAILY
Status: DISCONTINUED | OUTPATIENT
Start: 2023-12-30 | End: 2024-01-01 | Stop reason: HOSPADM

## 2023-12-30 RX ORDER — METHYLERGONOVINE MALEATE 0.2 MG/ML
200 INJECTION INTRAVENOUS PRN
Status: DISCONTINUED | OUTPATIENT
Start: 2023-12-30 | End: 2024-01-01 | Stop reason: HOSPADM

## 2023-12-30 RX ORDER — MISOPROSTOL 200 UG/1
800 TABLET ORAL PRN
Status: DISCONTINUED | OUTPATIENT
Start: 2023-12-30 | End: 2024-01-01 | Stop reason: HOSPADM

## 2023-12-30 RX ORDER — MODIFIED LANOLIN
OINTMENT (GRAM) TOPICAL PRN
Status: DISCONTINUED | OUTPATIENT
Start: 2023-12-30 | End: 2024-01-01 | Stop reason: HOSPADM

## 2023-12-30 RX ORDER — SIMETHICONE 80 MG
80 TABLET,CHEWABLE ORAL EVERY 6 HOURS PRN
Status: DISCONTINUED | OUTPATIENT
Start: 2023-12-30 | End: 2024-01-01 | Stop reason: HOSPADM

## 2023-12-30 RX ORDER — SODIUM CHLORIDE 9 MG/ML
INJECTION, SOLUTION INTRAVENOUS PRN
Status: DISCONTINUED | OUTPATIENT
Start: 2023-12-30 | End: 2024-01-01 | Stop reason: HOSPADM

## 2023-12-30 RX ORDER — SODIUM CHLORIDE 0.9 % (FLUSH) 0.9 %
5-40 SYRINGE (ML) INJECTION EVERY 12 HOURS SCHEDULED
Status: DISCONTINUED | OUTPATIENT
Start: 2023-12-30 | End: 2024-01-01 | Stop reason: HOSPADM

## 2023-12-30 RX ORDER — ACETAMINOPHEN 650 MG
TABLET, EXTENDED RELEASE ORAL
Status: COMPLETED
Start: 2023-12-30 | End: 2023-12-30

## 2023-12-30 RX ORDER — CARBOPROST TROMETHAMINE 250 UG/ML
250 INJECTION, SOLUTION INTRAMUSCULAR PRN
Status: DISCONTINUED | OUTPATIENT
Start: 2023-12-30 | End: 2024-01-01 | Stop reason: HOSPADM

## 2023-12-30 RX ORDER — NALOXONE HYDROCHLORIDE 0.4 MG/ML
INJECTION, SOLUTION INTRAMUSCULAR; INTRAVENOUS; SUBCUTANEOUS PRN
Status: DISCONTINUED | OUTPATIENT
Start: 2023-12-30 | End: 2023-12-30

## 2023-12-30 RX ADMIN — SODIUM CHLORIDE, POTASSIUM CHLORIDE, SODIUM LACTATE AND CALCIUM CHLORIDE: 600; 310; 30; 20 INJECTION, SOLUTION INTRAVENOUS at 06:31

## 2023-12-30 RX ADMIN — DEXTROSE MONOHYDRATE 5 MILLION UNITS: 50 INJECTION, SOLUTION INTRAVENOUS at 06:33

## 2023-12-30 RX ADMIN — Medication: at 19:56

## 2023-12-30 RX ADMIN — Medication 166.7 ML: at 07:34

## 2023-12-30 RX ADMIN — LIDOCAINE HYDROCHLORIDE 200 MG: 10 INJECTION, SOLUTION INFILTRATION; PERINEURAL at 07:40

## 2023-12-30 RX ADMIN — DOCUSATE SODIUM 100 MG: 100 CAPSULE, LIQUID FILLED ORAL at 19:56

## 2023-12-30 RX ADMIN — IBUPROFEN 800 MG: 800 TABLET, FILM COATED ORAL at 15:40

## 2023-12-30 RX ADMIN — Medication: at 07:25

## 2023-12-30 ASSESSMENT — PAIN DESCRIPTION - ORIENTATION: ORIENTATION: LOWER

## 2023-12-30 ASSESSMENT — PAIN DESCRIPTION - LOCATION: LOCATION: ABDOMEN

## 2023-12-30 ASSESSMENT — PAIN SCALES - GENERAL: PAINLEVEL_OUTOF10: 7

## 2023-12-30 ASSESSMENT — PAIN - FUNCTIONAL ASSESSMENT: PAIN_FUNCTIONAL_ASSESSMENT: ACTIVITIES ARE NOT PREVENTED

## 2023-12-30 ASSESSMENT — PAIN DESCRIPTION - DESCRIPTORS: DESCRIPTORS: ACHING;CRAMPING

## 2023-12-30 NOTE — PLAN OF CARE
Problem: Infection - Adult  Goal: Absence of infection during hospitalization  Outcome: Progressing  Flowsheets (Taken 12/30/2023 1500)  Absence of infection during hospitalization:   Assess and monitor for signs and symptoms of infection   Monitor lab/diagnostic results   Monitor all insertion sites i.e., indwelling lines, tubes and drains   Administer medications as ordered     Problem: Safety - Adult  Goal: Free from fall injury  Outcome: Progressing

## 2023-12-30 NOTE — PROGRESS NOTES
presents with complaints of leaking of fluid  since 0500 and ctx's since 0230 this morning. Pt states she has had some pink tinged discharge since her water broke. Pt states +FM. Placed on [facility], call light within reach.     Najma #[id number]

## 2023-12-30 NOTE — PROGRESS NOTES
Admitted to unit via  Sarah, #[id number].Oriented to room and call light. RN cell number explained and written on white board. Infant safety discussed and understanding verbalized. Information sheet for congenital heart disease screening given. ABCs of safe sleep discussed with understanding verbalized. Understands no fluffy blankets, no hat, nothing else in crib, sleep on back in crib with sleep sack or blanket under arms.

## 2023-12-30 NOTE — PROGRESS NOTES
RN remained at bedside throughout pushing.  EFM continuously assessed.  Vaginal delivery of viable infant.

## 2023-12-30 NOTE — PROGRESS NOTES
Dr. Echols notified of pt, SVE 6-7/100/-1, SROM at 0500, GBS unknown, pt unsure of if she wants an epidural. New orders received for routine admit orders, PCN for GBS prophylaxis, pt may have an epidural upon request.

## 2023-12-30 NOTE — H&P
Department of Obstetrics and Gynecology  Attending Obstetrics History and Physical        CHIEF COMPLAINT:  contractions    HISTORY OF PRESENT ILLNESS:      The patient is a 23 y.o.  2 parity 1 at 36.4 weeks.  Patient presents with a chief complaint as above and is being admitted for active phase labor    D  OB History    Para Term  AB Living   2 1 1     1   SAB IAB Ectopic Molar Multiple Live Births           0 1      # Outcome Date GA Lbr Star/2nd Weight Sex Delivery Anes PTL Lv   2 Current            1 Term 23 38w1d / 01:25 3.12 kg (6 lb 14.1 oz) M Vag-Spont EPI N ORESTES      Complications: Cord around body       Past Medical History:    History reviewed. No pertinent past medical history.  Past Surgical History:    History reviewed. No pertinent surgical history.  Social History:    TOBACCO:   reports that she has never smoked. She has never used smokeless tobacco.  ETOH:   reports no history of alcohol use.  DRUGS:   reports no history of drug use.  Family History:       Problem Relation Age of Onset    No Known Problems Father     No Known Problems Mother      Medications Prior to Admission:  Medications Prior to Admission: Prenatal Vit-Fe Fumarate-FA (PRENATAL VITAMINS) 28-0.8 MG TABS, Take 1 tablet by mouth daily  Allergies:  Patient has no known allergies.    REVIEW OF SYSTEMS:    Patient has no history of depression.  Patient has no symptoms of depression      PHYSICAL EXAM:    General appearance:  awake, alert, cooperative, no apparent distress, and appears stated age  Neurologic:  Normal DTRs  Lungs:  No increased work of breathing, good air exchange, clear to auscultation bilaterally, no crackles or wheezing  Heart:  Normal apical impulse, regular rate and rhythm, normal S1 and S2, no S3 or S4, and no murmur noted  Abdomen:  Gravid soft non-tender  Fetal heart rate:  Baseline Heart Rate 145, accelerations:  present    Cervix:    DILATION:  6 cm  EFFACEMENT:   100%  STATION:  0

## 2023-12-30 NOTE — L&D DELIVERY SUMMARY NOTE
Department of Obstetrics and Gynecology  Midwife Spontaneous Vaginal Delivery Note      Pre-operative Diagnosis:   pregnancy <37 weeks, Spontaneous labor, Single fetus, and Pregnancy complicated by: Late prenatal care, elevated glucola without further testing, dated by 31 weeks scan.    Post-operative Diagnosis:  Living  infant(s) and Male    Infant Wt:   Birth Weight: pending       APGARS:     APGAR One: 9  APGAR Five: 9       Anesthesia:  none      Delivery Summary: spontaneous vaginal delivery of a male baby in [facility] over 2nd degree lacerating  perineum. Head delivered easily, there was no shoulder dystocia. Infant placed on mother's lower abdomen for skin to skin due to short cord.  Cord clamped after 2+ minutes and cut by \"Auntie\". Placenta and menbranes, complete and normal were delivered. 7cc local lidocaine infiltrated to skin at perineum. 4 throws of 3-0 vicryl placed to repair small second degree.     Mother and Baby are in good condition.  Baby appears full term although 31 weeks scan places him at 36 weeks 4 days.   Mother had elevated 50 gram with no follow through so will check infant blood sugar and inform nursery.        Specimen:  placenta cord and membranes     Estimated blood loss: 300              Condition:  infant stable to general nursery    Blood Type and Rh: B POSITIVE        Rubella Immunity Status:   Unknown; ordered today           Infant Feeding:    bottle.     Dr Brian readily available for delivery.

## 2023-12-31 LAB
CULTURE: NORMAL
HCT VFR BLD AUTO: 34.7 % (ref 34–48)
HGB BLD-MCNC: 10.7 G/DL (ref 11.5–15.5)
SPECIMEN DESCRIPTION: NORMAL

## 2023-12-31 PROCEDURE — 85018 HEMOGLOBIN: CPT

## 2023-12-31 PROCEDURE — 6370000000 HC RX 637 (ALT 250 FOR IP): Performed by: ADVANCED PRACTICE MIDWIFE

## 2023-12-31 PROCEDURE — 85014 HEMATOCRIT: CPT

## 2023-12-31 PROCEDURE — 1220000000 HC SEMI PRIVATE OB R&B

## 2023-12-31 RX ADMIN — DOCUSATE SODIUM 100 MG: 100 CAPSULE, LIQUID FILLED ORAL at 19:43

## 2023-12-31 RX ADMIN — IBUPROFEN 800 MG: 800 TABLET, FILM COATED ORAL at 22:21

## 2023-12-31 RX ADMIN — IBUPROFEN 800 MG: 800 TABLET, FILM COATED ORAL at 08:47

## 2023-12-31 RX ADMIN — DOCUSATE SODIUM 100 MG: 100 CAPSULE, LIQUID FILLED ORAL at 08:47

## 2023-12-31 ASSESSMENT — PAIN - FUNCTIONAL ASSESSMENT: PAIN_FUNCTIONAL_ASSESSMENT: ACTIVITIES ARE NOT PREVENTED

## 2023-12-31 ASSESSMENT — PAIN DESCRIPTION - LOCATION
LOCATION: ABDOMEN
LOCATION: ABDOMEN

## 2023-12-31 ASSESSMENT — PAIN DESCRIPTION - DESCRIPTORS
DESCRIPTORS: ACHING;CRAMPING
DESCRIPTORS: ACHING;CRAMPING;DISCOMFORT

## 2023-12-31 ASSESSMENT — PAIN SCALES - GENERAL
PAINLEVEL_OUTOF10: 5
PAINLEVEL_OUTOF10: 7

## 2023-12-31 ASSESSMENT — PAIN DESCRIPTION - ORIENTATION: ORIENTATION: ANTERIOR;LOWER

## 2023-12-31 NOTE — PROGRESS NOTES
Post-Partum Note    PPD#1     S:  Patient without complaints.  Lochia normal.  Ambulating.       O: BP (!) 97/54   Pulse 56   Temp 98.7 °F (37.1 °C) (Oral)   Resp 16   Ht 1.524 m (5')   Wt 53.1 kg (117 lb)   LMP 03/26/2023   SpO2 95%   Breastfeeding Unknown   BMI 22.85 kg/m²               ABD:    Uterus firm, non-tender.           EXT:     No Jovana's.    LABS:   CBC:   Lab Results   Component Value Date/Time    WBC 7.0 12/30/2023 06:30 AM    RBC 5.12 12/30/2023 06:30 AM    HGB 10.7 12/31/2023 06:03 AM    HCT 34.7 12/31/2023 06:03 AM    MCV 76.0 12/30/2023 06:30 AM    MCH 23.0 12/30/2023 06:30 AM    MCHC 30.3 12/30/2023 06:30 AM    RDW 15.9 12/30/2023 06:30 AM     12/30/2023 06:30 AM    MPV 9.8 12/30/2023 06:30 AM       IMP:  1.  PPD#1, status-post vaginal delivery            2.    Patient Active Problem List   Diagnosis    Non-English speaking patient    Second pregnancy    Elevated glucose    34 weeks gestation of pregnancy    36 weeks gestation of pregnancy              3.  Mild acute blood loss anemia    Plan: 1.  Routine post-partum care

## 2023-12-31 NOTE — PLAN OF CARE
Problem: Postpartum  Goal: Experiences normal postpartum course  Description:  Postpartum OB-Pregnancy care plan goal which identifies if the mother is experiencing a normal postpartum course  Outcome: Progressing     Problem: Postpartum  Goal: Appropriate maternal -  bonding  Description:  Postpartum OB-Pregnancy care plan goal which identifies if the mother and  are bonding appropriately  Outcome: Progressing     Problem: Postpartum  Goal: Establishment of infant feeding pattern  Description:  Postpartum OB-Pregnancy care plan goal which identifies if the mother is establishing a feeding pattern with their   Outcome: Progressing

## 2024-01-01 VITALS
DIASTOLIC BLOOD PRESSURE: 57 MMHG | BODY MASS INDEX: 22.97 KG/M2 | SYSTOLIC BLOOD PRESSURE: 90 MMHG | OXYGEN SATURATION: 98 % | HEART RATE: 67 BPM | WEIGHT: 117 LBS | RESPIRATION RATE: 16 BRPM | HEIGHT: 60 IN | TEMPERATURE: 98.5 F

## 2024-01-01 LAB
CULTURE: NORMAL
SPECIMEN DESCRIPTION: NORMAL

## 2024-01-01 PROCEDURE — 6370000000 HC RX 637 (ALT 250 FOR IP): Performed by: ADVANCED PRACTICE MIDWIFE

## 2024-01-01 PROCEDURE — 99024 POSTOP FOLLOW-UP VISIT: CPT | Performed by: OBSTETRICS & GYNECOLOGY

## 2024-01-01 RX ORDER — IBUPROFEN 800 MG/1
800 TABLET ORAL EVERY 8 HOURS SCHEDULED
Qty: 120 TABLET | Refills: 3 | Status: SHIPPED | OUTPATIENT
Start: 2024-01-01

## 2024-01-01 RX ADMIN — IBUPROFEN 800 MG: 800 TABLET, FILM COATED ORAL at 08:17

## 2024-01-01 RX ADMIN — DOCUSATE SODIUM 100 MG: 100 CAPSULE, LIQUID FILLED ORAL at 08:17

## 2024-01-01 ASSESSMENT — PAIN SCALES - GENERAL: PAINLEVEL_OUTOF10: 2

## 2024-01-01 ASSESSMENT — PAIN DESCRIPTION - DESCRIPTORS: DESCRIPTORS: CRAMPING

## 2024-01-01 ASSESSMENT — PAIN DESCRIPTION - ORIENTATION: ORIENTATION: LOWER

## 2024-01-01 ASSESSMENT — PAIN - FUNCTIONAL ASSESSMENT: PAIN_FUNCTIONAL_ASSESSMENT: ACTIVITIES ARE NOT PREVENTED

## 2024-01-01 ASSESSMENT — PAIN DESCRIPTION - LOCATION: LOCATION: ABDOMEN

## 2024-01-01 NOTE — PROGRESS NOTES
With the assistance of Jerald  documented, discharge teaching done with mom. Verbalizes understanding.  Ready for discharge.

## 2024-01-01 NOTE — PROGRESS NOTES
Post Partum Vaginal Delivery Progress Note    Pt seen with .     SUBJECTIVE:  No complaints      Vitals:  Vitals:    01/01/24 0655   BP: (!) 90/57   Pulse: 67   Resp: 16   Temp: 98.5 °F (36.9 °C)   SpO2: 98%        Exam:  Fundus firm, non-tender, normal lochia  Extremities non-tender      DATA:    CBC:    Lab Results   Component Value Date/Time    WBC 7.0 12/30/2023 06:30 AM    RBC 5.12 12/30/2023 06:30 AM    HGB 10.7 12/31/2023 06:03 AM    HCT 34.7 12/31/2023 06:03 AM    MCV 76.0 12/30/2023 06:30 AM    RDW 15.9 12/30/2023 06:30 AM     12/30/2023 06:30 AM       ASSESSMENT & PLAN:  PPD # 2  Patient Active Problem List   Diagnosis    Non-English speaking patient    Second pregnancy    Elevated glucose    34 weeks gestation of pregnancy    36 weeks gestation of pregnancy     Routine postpartum care

## 2024-01-01 NOTE — PROGRESS NOTES
Rn entered patients room mother was awake and  was crying, RN offered to feed , mother accepted. RN asked patient if she was in any pain and she stated no. RN to feed  at nurses station then return to mother. Mother in agreement with plan.

## 2024-01-01 NOTE — DISCHARGE SUMMARY
Obstetrical Discharge Form    Gestational Age:36w4d    Antepartum complications: none    Date of Delivery: 2023     Type of Delivery: vaginal, spontaneous,     Delivered By:    Danette Winters CNM       Baby:   Information for the patient's :  Luke Hopkins [[id number]]      Anesthesia: None    Intrapartum complications:  delivery      Postpartum complications: none    Discharge Date:  home in stable condition.    Plan:   Follow up in 6 week(s)

## 2024-01-01 NOTE — DISCHARGE INSTRUCTIONS
the baby and if it does, delete it from your diet.  If it does not affect the baby, go ahead and eat it.  Avoid caffeine at bedtime. (chocolate has a lot of caffeine) if the baby is sensitive to it.    For non-breastfeeding moms:  You may apply ice packs to your breasts over your bra for twenty minutes at a time for comfort.  Avoid stimulation to your breasts, when showering allow the water to strike your back not your breasts.    Wear a good fitting bra until your milk dries, such as a sports bra.   If your breasts are very sore, buy a cabbage and wet some of the inner leaves and place in your bra over your breasts.    Your breasts may feel warm when your milk comes in.  This is normal.    INCISIONAL CARE / KEN CARE  Clean your incision in the shower with mild soap.  After shower pat the incision area dry and leave open to air.  If used, Steri-stipes should fall off in shower by 2 weeks.  If used, Staples should be removed by the OB in office by 1 week.  If used/ordered, an abdominal binder may provide support for your incision.   Use the ken-bottle after toileting until bleeding stops. It promotes healing and prevents infection.   You are prone to urinary tract infections after a delivery ( c section or vaginal delivery).  Drink a lot of fluids. Take a shower instead of a tub bath until bleeding stops.  Cleanse your perineum from front to back  If used, stitches or internal clips will dissolve in 4-6 weeks.  You may use a sitz bath or soak in a clean tub as needed for comfort.  Kegel exercises will help restore bladder control.   You may use cool compress on incision site.    SWELLING   It takes about 2 weeks to get rid of all of the extra fluid you have from having a baby.  Your feet and hands may swell up more than they are now. Keep your legs elevated when sitting or lying.  When wearing stocking or socks, make sure they are not too tight.    WHEN TO CALL THE DOCTOR  If you have a temp of 100.4 or more.

## 2024-01-01 NOTE — PROGRESS NOTES
Assuming care of patient. Patient resting in bed and eating. Plan of care discussed with patient; patient verbalizes understanding. Call light in reach. Patient denies any pain at this time.

## 2024-01-01 NOTE — PROGRESS NOTES
Assessment as charted.  Mom comfortable.  With assistance of , as charted - patient informed of discharge videos, need for Car Seat Challenge, and discharge process.

## 2024-01-02 LAB
C TRACH DNA GENITAL QL NAA+PROBE: NEGATIVE
MEV IGG SER-ACNC: ABNORMAL
MUV IGG SER IA-ACNC: ABNORMAL
N. GONORRHOEAE DNA: NEGATIVE
RUBV IGG SERPL QL IA: ABNORMAL IU/ML

## 2024-01-04 LAB — SURGICAL PATHOLOGY REPORT: NORMAL

## 2024-01-30 ENCOUNTER — POSTPARTUM VISIT (OUTPATIENT)
Dept: OBGYN | Age: 24
End: 2024-01-30

## 2024-01-30 VITALS
HEART RATE: 70 BPM | BODY MASS INDEX: 22.69 KG/M2 | DIASTOLIC BLOOD PRESSURE: 63 MMHG | WEIGHT: 116.2 LBS | SYSTOLIC BLOOD PRESSURE: 113 MMHG

## 2024-01-30 LAB
CONTROL: NEGATIVE
PREGNANCY TEST URINE, POC: NEGATIVE

## 2024-01-30 PROCEDURE — 99213 OFFICE O/P EST LOW 20 MIN: CPT | Performed by: NURSE PRACTITIONER

## 2024-01-30 PROCEDURE — 96372 THER/PROPH/DIAG INJ SC/IM: CPT | Performed by: NURSE PRACTITIONER

## 2024-01-30 RX ORDER — MEDROXYPROGESTERONE ACETATE 150 MG/ML
150 INJECTION, SUSPENSION INTRAMUSCULAR ONCE
Status: COMPLETED | OUTPATIENT
Start: 2024-01-30 | End: 2024-01-30

## 2024-01-30 RX ADMIN — MEDROXYPROGESTERONE ACETATE 150 MG: 150 INJECTION, SUSPENSION INTRAMUSCULAR at 10:56

## 2024-01-30 ASSESSMENT — PATIENT HEALTH QUESTIONNAIRE - PHQ9
SUM OF ALL RESPONSES TO PHQ QUESTIONS 1-9: 0
1. LITTLE INTEREST OR PLEASURE IN DOING THINGS: 0
SUM OF ALL RESPONSES TO PHQ9 QUESTIONS 1 & 2: 0
2. FEELING DOWN, DEPRESSED OR HOPELESS: 0

## 2024-01-30 NOTE — PROGRESS NOTES
Patient alert and pleasant , she states that since she had the baby her whole body hurts and sometimes she gets a pain in her back where she can hardly walk and get out of bed. Here today for routine post partum visit. Urine for pregnancy obtained with negative results. Depo Provera administered in left deltoid. No complaints of discomfort voiced. Discharge instructions have been discussed with the patient. Advised to call our office with any questions or concerns.   Voiced understanding.   
tobacco: Never   Vaping Use    Vaping Use: Never used   Substance and Sexual Activity    Alcohol use: Never    Drug use: Never    Sexual activity: Yes     Partners: Male     Social Determinants of Health     Financial Resource Strain: Medium Risk (2/23/2023)    Overall Financial Resource Strain (CARDIA)     Difficulty of Paying Living Expenses: Somewhat hard   Food Insecurity: No Food Insecurity (12/30/2023)    Hunger Vital Sign     Worried About Running Out of Food in the Last Year: Never true     Ran Out of Food in the Last Year: Never true   Transportation Needs: No Transportation Needs (12/30/2023)    PRAPARE - Transportation     Lack of Transportation (Medical): No     Lack of Transportation (Non-Medical): No   Housing Stability: Low Risk  (12/30/2023)    Housing Stability Vital Sign     Unable to Pay for Housing in the Last Year: No     Number of Places Lived in the Last Year: 1     Unstable Housing in the Last Year: No     Current Outpatient Medications   Medication Sig Dispense Refill    ibuprofen (ADVIL;MOTRIN) 800 MG tablet Take 1 tablet by mouth every 8 hours (Patient not taking: Reported on 1/30/2024) 120 tablet 3    Prenatal Vit-Fe Fumarate-FA (PRENATAL VITAMINS) 28-0.8 MG TABS Take 1 tablet by mouth daily (Patient not taking: Reported on 1/30/2024) 30 tablet 12     Current Facility-Administered Medications   Medication Dose Route Frequency Provider Last Rate Last Admin    medroxyPROGESTERone (DEPO-PROVERA) injection 150 mg  150 mg IntraMUSCular Once Mouna Hurtado, APRN - CNP           Review of Systems  Constitutional: negative  Eyes: negative  Ears, nose, mouth, throat, and face: negative  Respiratory: negative  Cardiovascular: negative  Gastrointestinal: negative  Genitourinary:see above  Integument/breast: negative  Hematologic/lymphatic: negative  Musculoskeletal:negative  Neurological: negative  Behavioral/Psych: negative  Endocrine: negative  Allergic/Immunologic: negative     Objective:

## 2024-05-02 ENCOUNTER — NURSE ONLY (OUTPATIENT)
Dept: OBGYN | Age: 24
End: 2024-05-02

## 2024-05-02 VITALS
HEART RATE: 80 BPM | WEIGHT: 128 LBS | HEIGHT: 60 IN | DIASTOLIC BLOOD PRESSURE: 70 MMHG | BODY MASS INDEX: 25.13 KG/M2 | SYSTOLIC BLOOD PRESSURE: 120 MMHG | RESPIRATION RATE: 16 BRPM

## 2024-05-02 DIAGNOSIS — Z32.02 NEGATIVE PREGNANCY TEST: Primary | ICD-10-CM

## 2024-05-02 LAB
CONTROL: NORMAL
PREGNANCY TEST URINE, POC: NORMAL

## 2024-05-02 PROCEDURE — 81025 URINE PREGNANCY TEST: CPT | Performed by: OBSTETRICS & GYNECOLOGY

## 2024-05-02 PROCEDURE — 96372 THER/PROPH/DIAG INJ SC/IM: CPT | Performed by: OBSTETRICS & GYNECOLOGY

## 2024-05-02 RX ORDER — MEDROXYPROGESTERONE ACETATE 150 MG/ML
150 INJECTION, SUSPENSION INTRAMUSCULAR ONCE
Status: COMPLETED | OUTPATIENT
Start: 2024-05-02 | End: 2024-05-02

## 2024-05-02 RX ADMIN — MEDROXYPROGESTERONE ACETATE 150 MG: 150 INJECTION, SUSPENSION INTRAMUSCULAR at 11:30

## 2024-05-02 RX ADMIN — MEDROXYPROGESTERONE ACETATE 150 MG: 150 INJECTION, SUSPENSION INTRAMUSCULAR at 11:22

## 2024-07-25 ENCOUNTER — OFFICE VISIT (OUTPATIENT)
Dept: OBGYN | Age: 24
End: 2024-07-25

## 2024-07-25 VITALS
SYSTOLIC BLOOD PRESSURE: 115 MMHG | DIASTOLIC BLOOD PRESSURE: 56 MMHG | BODY MASS INDEX: 24.54 KG/M2 | WEIGHT: 125 LBS | HEART RATE: 71 BPM | HEIGHT: 60 IN

## 2024-07-25 DIAGNOSIS — Z01.419 WOMEN'S ANNUAL ROUTINE GYNECOLOGICAL EXAMINATION: Primary | ICD-10-CM

## 2024-07-25 PROBLEM — Z3A.34 34 WEEKS GESTATION OF PREGNANCY: Status: RESOLVED | Noted: 2023-12-18 | Resolved: 2024-07-25

## 2024-07-25 PROBLEM — Z3A.36 36 WEEKS GESTATION OF PREGNANCY: Status: RESOLVED | Noted: 2023-12-30 | Resolved: 2024-07-25

## 2024-07-25 RX ORDER — MEDROXYPROGESTERONE ACETATE 150 MG/ML
150 INJECTION, SUSPENSION INTRAMUSCULAR ONCE
Status: CANCELLED | OUTPATIENT
Start: 2024-07-25 | End: 2024-07-25

## 2024-07-25 SDOH — ECONOMIC STABILITY: FOOD INSECURITY: WITHIN THE PAST 12 MONTHS, THE FOOD YOU BOUGHT JUST DIDN'T LAST AND YOU DIDN'T HAVE MONEY TO GET MORE.: NEVER TRUE

## 2024-07-25 SDOH — ECONOMIC STABILITY: INCOME INSECURITY: HOW HARD IS IT FOR YOU TO PAY FOR THE VERY BASICS LIKE FOOD, HOUSING, MEDICAL CARE, AND HEATING?: NOT HARD AT ALL

## 2024-07-25 SDOH — ECONOMIC STABILITY: FOOD INSECURITY: WITHIN THE PAST 12 MONTHS, YOU WORRIED THAT YOUR FOOD WOULD RUN OUT BEFORE YOU GOT MONEY TO BUY MORE.: NEVER TRUE

## 2024-07-25 NOTE — PROGRESS NOTES
Patient alert and pleasant with concerns about having headaches often after starting Depo-provera injection  Here today for annual GYN exam.  No pelvic exam today. Patient on her menses  Discharge instructions have been discussed with the patient. Patient advised to call our office with any questions or concerns.   Voiced understanding.   here to assist with this visit

## 2024-07-25 NOTE — PROGRESS NOTES
SUBJECTIVE: Marielle Colon   23 y.o. year old female   Pt presents for annual exam.  Pt is currently menstruating and will reschedule for exam  Pt states she has a headache every day since she started depo in January.  She has had 2 doses of depo.  The nurse at her job gave her medication for her headache and it does not work.  She thinks it was acetaminophen and then headache returns.   Pt states she fainted at work a few weeks ago and did not go to the doctor.  She felt it was all of the heat. She works at [employer].  The nurse saw her when she fainted and they sent her home.  C/o loss of sensation to Rt leg when standing    OB History    Para Term  AB Living   2 2 1 1   2   SAB IAB Ectopic Molar Multiple Live Births           0 2      # Outcome Date GA Lbr Star/2nd Weight Sex Delivery Anes PTL Lv   2  23 36w4d / 00:11 2.72 kg (5 lb 15.9 oz) M Vag-Spont None N ORESTES   1 Term 23 38w1d / 01:25 3.12 kg (6 lb 14.1 oz) M Vag-Spont EPI N ORESTES      Complications: Cord around body         OBJECTIVE:   Chief complaint:   Chief Complaint   Patient presents with    Gynecologic Exam     Last PAP never   Patient states since starting the depo-provera injection she has a headache everyday   Would like to discuss        BP (!) 115/56   Pulse 71   Ht 1.524 m (5')   Wt 56.7 kg (125 lb)   BMI 24.41 kg/m²   No past medical history on file.  No past surgical history on file.  Current Outpatient Medications   Medication Sig Dispense Refill    ibuprofen (ADVIL;MOTRIN) 800 MG tablet Take 1 tablet by mouth every 8 hours (Patient not taking: Reported on 2024) 120 tablet 3    Prenatal Vit-Fe Fumarate-FA (PRENATAL VITAMINS) 28-0.8 MG TABS Take 1 tablet by mouth daily (Patient not taking: Reported on 2024) 30 tablet 12     No current facility-administered medications for this visit.      Allergies:  Patient has no known allergies.     Patient Active Problem List   Diagnosis    Non-English

## 2024-07-30 ENCOUNTER — TELEPHONE (OUTPATIENT)
Dept: OBGYN | Age: 24
End: 2024-07-30

## 2024-07-30 NOTE — TELEPHONE ENCOUNTER
Patient was told that an oral birth control pill would be phoned in to her pharmacy, nothing has been received by them.  Please advise

## 2024-07-31 RX ORDER — NORGESTIMATE AND ETHINYL ESTRADIOL 7DAYSX3 LO
1 KIT ORAL DAILY
Qty: 28 TABLET | Refills: 3 | Status: SHIPPED | OUTPATIENT
Start: 2024-07-31

## 2024-10-25 ENCOUNTER — OFFICE VISIT (OUTPATIENT)
Dept: FAMILY MEDICINE CLINIC | Age: 24
End: 2024-10-25
Payer: COMMERCIAL

## 2024-10-25 VITALS
RESPIRATION RATE: 18 BRPM | BODY MASS INDEX: 23.16 KG/M2 | TEMPERATURE: 97.7 F | OXYGEN SATURATION: 100 % | HEIGHT: 60 IN | HEART RATE: 88 BPM | WEIGHT: 118 LBS

## 2024-10-25 DIAGNOSIS — J02.9 SORE THROAT: ICD-10-CM

## 2024-10-25 DIAGNOSIS — B34.9 VIRAL ILLNESS: Primary | ICD-10-CM

## 2024-10-25 DIAGNOSIS — R09.81 SINUS CONGESTION: ICD-10-CM

## 2024-10-25 PROCEDURE — 87426 SARSCOV CORONAVIRUS AG IA: CPT | Performed by: NURSE PRACTITIONER

## 2024-10-25 PROCEDURE — G8484 FLU IMMUNIZE NO ADMIN: HCPCS | Performed by: NURSE PRACTITIONER

## 2024-10-25 PROCEDURE — 99213 OFFICE O/P EST LOW 20 MIN: CPT | Performed by: NURSE PRACTITIONER

## 2024-10-25 PROCEDURE — 1036F TOBACCO NON-USER: CPT | Performed by: NURSE PRACTITIONER

## 2024-10-25 PROCEDURE — G8420 CALC BMI NORM PARAMETERS: HCPCS | Performed by: NURSE PRACTITIONER

## 2024-10-25 PROCEDURE — 87880 STREP A ASSAY W/OPTIC: CPT | Performed by: NURSE PRACTITIONER

## 2024-10-25 PROCEDURE — G8427 DOCREV CUR MEDS BY ELIG CLIN: HCPCS | Performed by: NURSE PRACTITIONER

## 2024-10-25 RX ORDER — BROMPHENIRAMINE MALEATE, PSEUDOEPHEDRINE HYDROCHLORIDE, AND DEXTROMETHORPHAN HYDROBROMIDE 2; 30; 10 MG/5ML; MG/5ML; MG/5ML
SYRUP ORAL
Qty: 180 ML | Refills: 0 | Status: SHIPPED | OUTPATIENT
Start: 2024-10-25

## 2024-10-25 NOTE — PROGRESS NOTES
10/25/24  Marielle Colon : 2000 Sex: female  Age 23 y.o.    Subjective:  Chief Complaint   Patient presents with    Pharyngitis       HPI:   Marielle Colon , 23 y.o. female presents to the clinic with friend for evaluation of sore throat x 3 days. The patient also reports fever (max 100 F), sinus congestion, cough . The patient has not taken any treatment for symptoms. The patient reports unchanged symptoms over time. The patient reports possible ill exposure. The patient denies headache, rash, and fever. The patient also denies chest pain, abdominal pain, shortness of breath, wheezing, and nausea / vomiting / diarrhea.    ROS:   Unless otherwise stated in this report the patient's positive and negative responses for review of systems for constitutional, eyes, ENT, cardiovascular, respiratory, gastrointestinal, neurological, , musculoskeletal, and integument systems and related systems to the presenting problem are either stated in the history of present illness or were not pertinent or were negative for the symptoms and/or complaints related to the presenting medical problem.  Positives and pertinent negatives as per HPI.  All others reviewed and are negative.      PMH:   History reviewed. No pertinent past medical history.    History reviewed. No pertinent surgical history.    Family History   Problem Relation Age of Onset    No Known Problems Father     No Known Problems Mother        Medications:     Current Outpatient Medications:     brompheniramine-pseudoephedrine-DM 2-30-10 MG/5ML syrup, 5 - 10 mL by mouth every 6 hours as needed for cough / congestion., Disp: 180 mL, Rfl: 0    Norgestim-Eth Estrad Triphasic (ORTHO TRI-CYCLEN LO) 0.18/0.215/0.25 MG-25 MCG TABS, Take 1 tablet by mouth daily, Disp: 28 tablet, Rfl: 3    ibuprofen (ADVIL;MOTRIN) 800 MG tablet, Take 1 tablet by mouth every 8 hours (Patient not taking: Reported on 2024), Disp: 120 tablet, Rfl: 3    Prenatal

## 2025-01-14 ENCOUNTER — OFFICE VISIT (OUTPATIENT)
Dept: OBGYN | Age: 25
End: 2025-01-14
Payer: COMMERCIAL

## 2025-01-14 VITALS
HEART RATE: 66 BPM | TEMPERATURE: 98 F | BODY MASS INDEX: 23.22 KG/M2 | WEIGHT: 118.9 LBS | DIASTOLIC BLOOD PRESSURE: 54 MMHG | SYSTOLIC BLOOD PRESSURE: 104 MMHG

## 2025-01-14 DIAGNOSIS — Z30.42 FAMILY PLANNING, DEPO-PROVERA CONTRACEPTION MONITORING/ADMINISTRATION: Primary | ICD-10-CM

## 2025-01-14 DIAGNOSIS — Z01.419 WELL WOMAN EXAM WITH ROUTINE GYNECOLOGICAL EXAM: ICD-10-CM

## 2025-01-14 LAB
CONTROL: NORMAL
PREGNANCY TEST URINE, POC: NEGATIVE

## 2025-01-14 PROCEDURE — 1036F TOBACCO NON-USER: CPT | Performed by: NURSE PRACTITIONER

## 2025-01-14 PROCEDURE — G8427 DOCREV CUR MEDS BY ELIG CLIN: HCPCS | Performed by: NURSE PRACTITIONER

## 2025-01-14 PROCEDURE — 81025 URINE PREGNANCY TEST: CPT | Performed by: NURSE PRACTITIONER

## 2025-01-14 PROCEDURE — G8420 CALC BMI NORM PARAMETERS: HCPCS | Performed by: NURSE PRACTITIONER

## 2025-01-14 PROCEDURE — 96372 THER/PROPH/DIAG INJ SC/IM: CPT | Performed by: NURSE PRACTITIONER

## 2025-01-14 PROCEDURE — 99395 PREV VISIT EST AGE 18-39: CPT | Performed by: NURSE PRACTITIONER

## 2025-01-14 RX ORDER — MEDROXYPROGESTERONE ACETATE 150 MG/ML
150 INJECTION, SUSPENSION INTRAMUSCULAR ONCE
Status: COMPLETED | OUTPATIENT
Start: 2025-01-14 | End: 2025-01-14

## 2025-01-14 RX ADMIN — MEDROXYPROGESTERONE ACETATE 150 MG: 150 INJECTION, SUSPENSION INTRAMUSCULAR at 11:12

## 2025-01-14 NOTE — PROGRESS NOTES
CHIEF COMPLAINT:  Here for annual exam.       HISTORY OF PRESENT ILLNESS:    24 y.o. female   presents for her annual exam. She had no concerns or complaints today. Her menses are monthly, lasting 5-6 days. She denies breakthrough bleeding, pelvic pain, or abnormal discharge.Bowel and bladder function is normal. Her health overall has been good.   Pt was prescribed depo originally postpartum, she had two injections however she was having side effects like headaches. She stopped that and was started on ocp. She was not remembering to take her ocp daily. She wants to restart the depo.                            Urine hcg obtained this visit       Past Medical History: No past medical history on file.     Past Surgical History: No past surgical history on file.     Allergies: Patient has no known allergies.     Medications:   Current Outpatient Medications   Medication Sig Dispense Refill    brompheniramine-pseudoephedrine-DM 2-30-10 MG/5ML syrup 5 - 10 mL by mouth every 6 hours as needed for cough / congestion. (Patient not taking: Reported on 2025) 180 mL 0    Norgestim-Eth Estrad Triphasic (ORTHO TRI-CYCLEN LO) 0.18/0.215/0.25 MG-25 MCG TABS Take 1 tablet by mouth daily (Patient not taking: Reported on 2024) 28 tablet 3    ibuprofen (ADVIL;MOTRIN) 800 MG tablet Take 1 tablet by mouth every 8 hours (Patient not taking: Reported on 2025) 120 tablet 3    Prenatal Vit-Fe Fumarate-FA (PRENATAL VITAMINS) 28-0.8 MG TABS Take 1 tablet by mouth daily (Patient not taking: Reported on 2024) 30 tablet 12     Current Facility-Administered Medications   Medication Dose Route Frequency Provider Last Rate Last Admin    medroxyPROGESTERone (DEPO-PROVERA) injection 150 mg  150 mg IntraMUSCular Once              Social History:   Social History     Tobacco Use    Smoking status: Never    Smokeless tobacco: Never   Substance Use Topics    Alcohol use: Never        Family History:   Family History   Problem

## 2025-01-14 NOTE — PROGRESS NOTES
Patient alert and pleasant with no complaints  Here today for annual GYN visit and to review medication. AMN  #[id number], Brian, available via iPAD for interpretation.  Pelvic exam completed, pap smear obtained, labeled and sent to lab. Urine obtained for pregnancy with negative results.  Depo-provera 150 mg IM given in the left deltoid without difficulty    Discharge instructions have been discussed with the patient. Patient advised to call our office with any questions or concerns.   Voiced understanding.

## 2025-01-19 LAB
CHLAMYDIA BY THIN PREP: NORMAL
HPV SAMPLE: NORMAL
HPV SOURCE: NORMAL
HPV, GENOTYPE 16: NOT DETECTED
HPV, GENOTYPE 18: NOT DETECTED
HPV, HIGH RISK OTHER: NOT DETECTED
HPV, INTERPRETATION: NORMAL
N. GONORRHOEAE DNA, THIN PREP: NORMAL

## 2025-01-20 LAB
CHLAMYDIA BY THIN PREP: NEGATIVE
GYNECOLOGY CYTOLOGY REPORT: NORMAL
N. GONORRHOEAE DNA, THIN PREP: NEGATIVE

## 2025-04-08 ENCOUNTER — CLINICAL SUPPORT (OUTPATIENT)
Dept: OBGYN | Age: 25
End: 2025-04-08
Payer: COMMERCIAL

## 2025-04-08 VITALS
HEART RATE: 67 BPM | WEIGHT: 121 LBS | DIASTOLIC BLOOD PRESSURE: 65 MMHG | SYSTOLIC BLOOD PRESSURE: 116 MMHG | BODY MASS INDEX: 23.63 KG/M2

## 2025-04-08 DIAGNOSIS — Z30.42 FAMILY PLANNING, DEPO-PROVERA CONTRACEPTION MONITORING/ADMINISTRATION: Primary | ICD-10-CM

## 2025-04-08 LAB
CONTROL: NORMAL
PREGNANCY TEST URINE, POC: NORMAL

## 2025-04-08 PROCEDURE — 81025 URINE PREGNANCY TEST: CPT | Performed by: NURSE PRACTITIONER

## 2025-04-08 PROCEDURE — 96372 THER/PROPH/DIAG INJ SC/IM: CPT | Performed by: NURSE PRACTITIONER

## 2025-04-08 RX ORDER — MEDROXYPROGESTERONE ACETATE 150 MG/ML
150 INJECTION, SUSPENSION INTRAMUSCULAR ONCE
Status: COMPLETED | OUTPATIENT
Start: 2025-04-08 | End: 2025-04-08

## 2025-04-08 RX ADMIN — MEDROXYPROGESTERONE ACETATE 150 MG: 150 INJECTION, SUSPENSION INTRAMUSCULAR at 10:14

## 2025-04-08 NOTE — PROGRESS NOTES
Injections (Depo provera injection )      No LMP recorded.    POC testing performed this visit:  [x] YES  []NO    Provider notified of the following results:  Results for orders placed or performed in visit on 04/08/25   POCT urine pregnancy   Result Value Ref Range    Preg Test, Ur neg Negative    Control          Clinic Admin. Medication/ Immunization:   [x] YES  []NO    Administrations This Visit       medroxyPROGESTERone (DEPO-PROVERA) injection 150 mg       Admin Date  04/08/2025  10:14 Action  Given Dose  150 mg Route  IntraMUSCular Site  Ventrogluteal Right Documented By  Anna Oakley LPN    NDC: 81652-815-46    Lot#: [id number]    : XIROMED    Patient Supplied?: No                       Next office visit scheduled:  [] YES  []NO    Scheduled date: 7/1/2025     Discharge instructions have been discussed with the patient. Patient advised to call our office with any questions or concerns.   Voiced understanding.

## 2025-07-30 ENCOUNTER — CLINICAL SUPPORT (OUTPATIENT)
Dept: OBGYN | Age: 25
End: 2025-07-30
Payer: COMMERCIAL

## 2025-07-30 VITALS
DIASTOLIC BLOOD PRESSURE: 74 MMHG | BODY MASS INDEX: 24.02 KG/M2 | SYSTOLIC BLOOD PRESSURE: 116 MMHG | WEIGHT: 123 LBS | HEART RATE: 66 BPM

## 2025-07-30 DIAGNOSIS — N93.9 ABNORMAL UTERINE BLEEDING (AUB): Primary | ICD-10-CM

## 2025-07-30 LAB
CONTROL: NEGATIVE
PREGNANCY TEST URINE, POC: NEGATIVE

## 2025-07-30 PROCEDURE — 96372 THER/PROPH/DIAG INJ SC/IM: CPT | Performed by: NURSE PRACTITIONER

## 2025-07-30 PROCEDURE — 81025 URINE PREGNANCY TEST: CPT | Performed by: NURSE PRACTITIONER

## 2025-07-30 RX ORDER — MEDROXYPROGESTERONE ACETATE 150 MG/ML
150 INJECTION, SUSPENSION INTRAMUSCULAR ONCE
Status: COMPLETED | OUTPATIENT
Start: 2025-07-30 | End: 2025-07-30

## 2025-07-30 RX ORDER — MEDROXYPROGESTERONE ACETATE 150 MG/ML
150 INJECTION, SUSPENSION INTRAMUSCULAR ONCE
Status: SHIPPED | OUTPATIENT
Start: 2025-10-22

## 2025-07-30 RX ADMIN — MEDROXYPROGESTERONE ACETATE 150 MG: 150 INJECTION, SUSPENSION INTRAMUSCULAR at 10:30

## 2025-07-30 SDOH — ECONOMIC STABILITY: FOOD INSECURITY: WITHIN THE PAST 12 MONTHS, YOU WORRIED THAT YOUR FOOD WOULD RUN OUT BEFORE YOU GOT MONEY TO BUY MORE.: NEVER TRUE

## 2025-07-30 SDOH — ECONOMIC STABILITY: FOOD INSECURITY: WITHIN THE PAST 12 MONTHS, THE FOOD YOU BOUGHT JUST DIDN'T LAST AND YOU DIDN'T HAVE MONEY TO GET MORE.: NEVER TRUE

## 2025-07-30 ASSESSMENT — PATIENT HEALTH QUESTIONNAIRE - PHQ9
SUM OF ALL RESPONSES TO PHQ QUESTIONS 1-9: 0
SUM OF ALL RESPONSES TO PHQ QUESTIONS 1-9: 0
2. FEELING DOWN, DEPRESSED OR HOPELESS: NOT AT ALL
1. LITTLE INTEREST OR PLEASURE IN DOING THINGS: NOT AT ALL
SUM OF ALL RESPONSES TO PHQ QUESTIONS 1-9: 0
SUM OF ALL RESPONSES TO PHQ QUESTIONS 1-9: 0

## 2025-07-30 NOTE — PROGRESS NOTES
Injections (Depo Provera)    No complaints at this time    Results for orders placed or performed in visit on 07/30/25   POCT urine pregnancy   Result Value Ref Range    Preg Test, Ur Negative Negative    Control Negative         Administrations This Visit       medroxyPROGESTERone (DEPO-PROVERA) injection 150 mg       Admin Date  07/30/2025  10:30 Action  Given Dose  150 mg Route  IntraMUSCular Site  Deltoid Left Documented By  Melvi Rocha LPN    NDC: 16787-937-10    Lot#: [id number]    : OLIVIER    Patient Supplied?: No    Comments: Per patient request                   Discharge instructions given, voiced understanding.